# Patient Record
Sex: FEMALE | Race: WHITE | Employment: OTHER | URBAN - METROPOLITAN AREA
[De-identification: names, ages, dates, MRNs, and addresses within clinical notes are randomized per-mention and may not be internally consistent; named-entity substitution may affect disease eponyms.]

---

## 2017-06-19 ENCOUNTER — HOSPITAL ENCOUNTER (OUTPATIENT)
Dept: PHYSICAL THERAPY | Age: 62
Discharge: HOME | End: 2017-06-19
Payer: COMMERCIAL

## 2017-06-19 ENCOUNTER — HOSPITAL ENCOUNTER (OUTPATIENT)
Dept: SURGERY | Age: 62
Discharge: HOME | End: 2017-06-19
Payer: COMMERCIAL

## 2017-06-19 LAB
ANION GAP BLD CALC-SCNC: 10 MMOL/L (ref 7–16)
APPEARANCE UR: CLEAR
APTT PPP: 22.5 SEC (ref 23.5–31.7)
ATRIAL RATE: 66 BPM
BACTERIA SPEC CULT: NORMAL
BACTERIA URNS QL MICRO: 0 /HPF
BASOPHILS # BLD AUTO: 0 K/UL (ref 0–0.2)
BASOPHILS # BLD: 0 % (ref 0–2)
BILIRUB UR QL: NEGATIVE
BUN SERPL-MCNC: 15 MG/DL (ref 8–23)
CALCIUM SERPL-MCNC: 9.1 MG/DL (ref 8.3–10.4)
CALCULATED P AXIS, ECG09: 73 DEGREES
CALCULATED R AXIS, ECG10: 80 DEGREES
CALCULATED T AXIS, ECG11: 66 DEGREES
CASTS URNS QL MICRO: ABNORMAL /LPF
CHLORIDE SERPL-SCNC: 102 MMOL/L (ref 98–107)
CO2 SERPL-SCNC: 27 MMOL/L (ref 21–32)
COLOR UR: ABNORMAL
CREAT SERPL-MCNC: 0.84 MG/DL (ref 0.6–1)
DIAGNOSIS, 93000: NORMAL
DIFFERENTIAL METHOD BLD: NORMAL
EOSINOPHIL # BLD: 0.2 K/UL (ref 0–0.8)
EOSINOPHIL NFR BLD: 4 % (ref 0.5–7.8)
EPI CELLS #/AREA URNS HPF: ABNORMAL /HPF
ERYTHROCYTE [DISTWIDTH] IN BLOOD BY AUTOMATED COUNT: 13 % (ref 11.9–14.6)
GLUCOSE SERPL-MCNC: 81 MG/DL (ref 65–100)
GLUCOSE UR STRIP.AUTO-MCNC: NEGATIVE MG/DL
HCT VFR BLD AUTO: 39.5 % (ref 35.8–46.3)
HGB BLD-MCNC: 13.2 G/DL (ref 11.7–15.4)
HGB UR QL STRIP: NEGATIVE
IMM GRANULOCYTES # BLD: 0 K/UL (ref 0–0.5)
IMM GRANULOCYTES NFR BLD AUTO: 0.2 % (ref 0–5)
INR PPP: 1 (ref 0.9–1.2)
KETONES UR QL STRIP.AUTO: NEGATIVE MG/DL
LEUKOCYTE ESTERASE UR QL STRIP.AUTO: ABNORMAL
LYMPHOCYTES # BLD AUTO: 23 % (ref 13–44)
LYMPHOCYTES # BLD: 1.2 K/UL (ref 0.5–4.6)
MCH RBC QN AUTO: 29 PG (ref 26.1–32.9)
MCHC RBC AUTO-ENTMCNC: 33.4 G/DL (ref 31.4–35)
MCV RBC AUTO: 86.8 FL (ref 79.6–97.8)
MONOCYTES # BLD: 0.4 K/UL (ref 0.1–1.3)
MONOCYTES NFR BLD AUTO: 8 % (ref 4–12)
NEUTS SEG # BLD: 3.5 K/UL (ref 1.7–8.2)
NEUTS SEG NFR BLD AUTO: 65 % (ref 43–78)
NITRITE UR QL STRIP.AUTO: NEGATIVE
P-R INTERVAL, ECG05: 166 MS
PH UR STRIP: 5 [PH] (ref 5–9)
PLATELET # BLD AUTO: 214 K/UL (ref 150–450)
PMV BLD AUTO: 11.1 FL (ref 10.8–14.1)
POTASSIUM SERPL-SCNC: 3.4 MMOL/L (ref 3.5–5.1)
PROT UR STRIP-MCNC: NEGATIVE MG/DL
PROTHROMBIN TIME: 10.3 SEC (ref 8.6–12.2)
Q-T INTERVAL, ECG07: 418 MS
QRS DURATION, ECG06: 88 MS
QTC CALCULATION (BEZET), ECG08: 438 MS
RBC # BLD AUTO: 4.55 M/UL (ref 4.05–5.25)
RBC #/AREA URNS HPF: ABNORMAL /HPF
SERVICE CMNT-IMP: NORMAL
SODIUM SERPL-SCNC: 139 MMOL/L (ref 136–145)
SP GR UR REFRACTOMETRY: 1.02 (ref 1–1.02)
UROBILINOGEN UR QL STRIP.AUTO: 0.2 EU/DL (ref 0.2–1)
VENTRICULAR RATE, ECG03: 66 BPM
WBC # BLD AUTO: 5.4 K/UL (ref 4.3–11.1)
WBC URNS QL MICRO: ABNORMAL /HPF

## 2017-06-19 PROCEDURE — 97161 PT EVAL LOW COMPLEX 20 MIN: CPT

## 2017-06-19 RX ORDER — LEVOTHYROXINE SODIUM 125 UG/1
125 TABLET ORAL
COMMUNITY

## 2017-06-19 RX ORDER — ASPIRIN 325 MG
325 TABLET ORAL AS NEEDED
COMMUNITY
End: 2017-06-29

## 2017-06-19 NOTE — ADVANCED PRACTICE NURSE
Total Joint Surgery Preoperative Chart Review      Patient ID:  Debi Record  [id number]  29 y.o.  1955  Surgeon: Dr. Carlo Murphy  Date of Surgery: 6/27/2017  Procedure: Total Right Knee Arthroplasty  Primary Care Physician: Gabriela Ba [phone]  Specialty Physician(s):      Subjective:   Debi Record is a 57 y.o. WHITE OR  female who presents for preoperative evaluation for Total Right Knee arthroplasty. This is a preoperative chart review note based on data collected by the nurse at the surgical Pre-Assessment visit. Past Medical History:   Diagnosis Date    Migraine     Osteopenia       Past Surgical History:   Procedure Laterality Date    HX HYSTERECTOMY      HX ORTHOPAEDIC  1996    right knee     History reviewed. No pertinent family history. Social History   Substance Use Topics    Smoking status: Former Smoker    Smokeless tobacco: Not on file      Comment: as teenager    Alcohol use Yes      Comment: occ       Prior to Admission medications    Medication Sig Start Date End Date Taking? Authorizing Provider   levothyroxine (SYNTHROID) 125 mcg tablet Take 125 mcg by mouth Daily (before breakfast). Indications: hypothyroidism   Yes Historical Provider   aspirin (ASPIRIN) 325 mg tablet Take 325 mg by mouth as needed. Stop 5 days prior to surgery per anesthesia guidelines.    Indications: HEADACHE DISORDER   Yes Historical Provider     No Known Allergies       Objective:     Physical Exam:   Patient Vitals for the past 24 hrs:   Temp Pulse Resp BP   06/19/17 1343 96.6 °F (35.9 °C) 67 16 106/79       ECG:    EKG Results     Procedure 720 Value Units Date/Time    EKG, 12 LEAD, INITIAL [[id number]] Collected:  06/19/17 1336    Order Status:  Completed Updated:  06/19/17 1406     Ventricular Rate 66 BPM      Atrial Rate 66 BPM      P-R Interval 166 ms      QRS Duration 88 ms      Q-T Interval 418 ms      QTC Calculation (Bezet) 438 ms      Calculated P Axis 73 degrees Calculated R Axis 80 degrees      Calculated T Axis 66 degrees      Diagnosis --     Normal sinus rhythm  Normal ECG  No previous ECGs available            Data Review:   Labs:   Recent Results (from the past 24 hour(s))   CBC WITH AUTOMATED DIFF    Collection Time: 06/19/17 12:51 PM   Result Value Ref Range    WBC 5.4 4.3 - 11.1 K/uL    RBC 4.55 4.05 - 5.25 M/uL    HGB 13.2 11.7 - 15.4 g/dL    HCT 39.5 35.8 - 46.3 %    MCV 86.8 79.6 - 97.8 FL    MCH 29.0 26.1 - 32.9 PG    MCHC 33.4 31.4 - 35.0 g/dL    RDW 13.0 11.9 - 14.6 %    PLATELET 029 787 - 350 K/uL    MPV 11.1 10.8 - 14.1 FL    DF AUTOMATED      NEUTROPHILS 65 43 - 78 %    LYMPHOCYTES 23 13 - 44 %    MONOCYTES 8 4.0 - 12.0 %    EOSINOPHILS 4 0.5 - 7.8 %    BASOPHILS 0 0.0 - 2.0 %    IMMATURE GRANULOCYTES 0.2 0.0 - 5.0 %    ABS. NEUTROPHILS 3.5 1.7 - 8.2 K/UL    ABS. LYMPHOCYTES 1.2 0.5 - 4.6 K/UL    ABS. MONOCYTES 0.4 0.1 - 1.3 K/UL    ABS. EOSINOPHILS 0.2 0.0 - 0.8 K/UL    ABS. BASOPHILS 0.0 0.0 - 0.2 K/UL    ABS. IMM.  GRANS. 0.0 0.0 - 0.5 K/UL   METABOLIC PANEL, BASIC    Collection Time: 06/19/17 12:51 PM   Result Value Ref Range    Sodium 139 136 - 145 mmol/L    Potassium 3.4 (L) 3.5 - 5.1 mmol/L    Chloride 102 98 - 107 mmol/L    CO2 27 21 - 32 mmol/L    Anion gap 10 7 - 16 mmol/L    Glucose 81 65 - 100 mg/dL    BUN 15 8 - 23 MG/DL    Creatinine 0.84 0.6 - 1.0 MG/DL    GFR est AA >60 >60 ml/min/1.73m2    GFR est non-AA >60 >60 ml/min/1.73m2    Calcium 9.1 8.3 - 10.4 MG/DL   URINALYSIS W/ RFLX MICROSCOPIC    Collection Time: 06/19/17 12:51 PM   Result Value Ref Range    Color JEOVANNY      Appearance CLEAR      Specific gravity 1.024 (H) 1.001 - 1.023      pH (UA) 5.0 5.0 - 9.0      Protein NEGATIVE  NEG mg/dL    Glucose NEGATIVE  mg/dL    Ketone NEGATIVE  NEG mg/dL    Bilirubin NEGATIVE  NEG      Blood NEGATIVE  NEG      Urobilinogen 0.2 0.2 - 1.0 EU/dL    Nitrites NEGATIVE  NEG      Leukocyte Esterase SMALL (A) NEG      WBC 0-3 0 /hpf    RBC 0-3 0 /hpf Epithelial cells 0-3 0 /hpf    Bacteria 0 0 /hpf    Casts 0-3 0 /lpf   EKG, 12 LEAD, INITIAL    Collection Time: 06/19/17  1:36 PM   Result Value Ref Range    Ventricular Rate 66 BPM    Atrial Rate 66 BPM    P-R Interval 166 ms    QRS Duration 88 ms    Q-T Interval 418 ms    QTC Calculation (Bezet) 438 ms    Calculated P Axis 73 degrees    Calculated R Axis 80 degrees    Calculated T Axis 66 degrees    Diagnosis       Normal sinus rhythm  Normal ECG  No previous ECGs available         Total Joint Surgery Pre-Assessment Recommendations:        Low risk assessment based on historical assessment. No further recommendations prior to scheduled surgery.            Signed By: Theresa Spencer, NP-C    June 19, 2017

## 2017-06-19 NOTE — PERIOP NOTES
Recent Results (from the past 12 hour(s))   CBC WITH AUTOMATED DIFF    Collection Time: 06/19/17 12:51 PM   Result Value Ref Range    WBC 5.4 4.3 - 11.1 K/uL    RBC 4.55 4.05 - 5.25 M/uL    HGB 13.2 11.7 - 15.4 g/dL    HCT 39.5 35.8 - 46.3 %    MCV 86.8 79.6 - 97.8 FL    MCH 29.0 26.1 - 32.9 PG    MCHC 33.4 31.4 - 35.0 g/dL    RDW 13.0 11.9 - 14.6 %    PLATELET 674 543 - 264 K/uL    MPV 11.1 10.8 - 14.1 FL    DF AUTOMATED      NEUTROPHILS 65 43 - 78 %    LYMPHOCYTES 23 13 - 44 %    MONOCYTES 8 4.0 - 12.0 %    EOSINOPHILS 4 0.5 - 7.8 %    BASOPHILS 0 0.0 - 2.0 %    IMMATURE GRANULOCYTES 0.2 0.0 - 5.0 %    ABS. NEUTROPHILS 3.5 1.7 - 8.2 K/UL    ABS. LYMPHOCYTES 1.2 0.5 - 4.6 K/UL    ABS. MONOCYTES 0.4 0.1 - 1.3 K/UL    ABS. EOSINOPHILS 0.2 0.0 - 0.8 K/UL    ABS. BASOPHILS 0.0 0.0 - 0.2 K/UL    ABS. IMM.  GRANS. 0.0 0.0 - 0.5 K/UL   PROTHROMBIN TIME + INR    Collection Time: 06/19/17 12:51 PM   Result Value Ref Range    Prothrombin time 10.3 8.6 - 12.2 sec    INR 1.0 0.9 - 1.2     PTT    Collection Time: 06/19/17 12:51 PM   Result Value Ref Range    aPTT 22.5 (L) 23.5 - 55.4 SEC   METABOLIC PANEL, BASIC    Collection Time: 06/19/17 12:51 PM   Result Value Ref Range    Sodium 139 136 - 145 mmol/L    Potassium 3.4 (L) 3.5 - 5.1 mmol/L    Chloride 102 98 - 107 mmol/L    CO2 27 21 - 32 mmol/L    Anion gap 10 7 - 16 mmol/L    Glucose 81 65 - 100 mg/dL    BUN 15 8 - 23 MG/DL    Creatinine 0.84 0.6 - 1.0 MG/DL    GFR est AA >60 >60 ml/min/1.73m2    GFR est non-AA >60 >60 ml/min/1.73m2    Calcium 9.1 8.3 - 10.4 MG/DL   URINALYSIS W/ RFLX MICROSCOPIC    Collection Time: 06/19/17 12:51 PM   Result Value Ref Range    Color JEOVANNY      Appearance CLEAR      Specific gravity 1.024 (H) 1.001 - 1.023      pH (UA) 5.0 5.0 - 9.0      Protein NEGATIVE  NEG mg/dL    Glucose NEGATIVE  mg/dL    Ketone NEGATIVE  NEG mg/dL    Bilirubin NEGATIVE  NEG      Blood NEGATIVE  NEG      Urobilinogen 0.2 0.2 - 1.0 EU/dL    Nitrites NEGATIVE  NEG Leukocyte Esterase SMALL (A) NEG      WBC 0-3 0 /hpf    RBC 0-3 0 /hpf    Epithelial cells 0-3 0 /hpf    Bacteria 0 0 /hpf    Casts 0-3 0 /lpf   EKG, 12 LEAD, INITIAL    Collection Time: 06/19/17  1:36 PM   Result Value Ref Range    Ventricular Rate 66 BPM    Atrial Rate 66 BPM    P-R Interval 166 ms    QRS Duration 88 ms    Q-T Interval 418 ms    QTC Calculation (Bezet) 438 ms    Calculated P Axis 73 degrees    Calculated R Axis 80 degrees    Calculated T Axis 66 degrees    Diagnosis       Normal sinus rhythm  Normal ECG  No previous ECGs available

## 2017-06-19 NOTE — PROGRESS NOTES
Jessica Daugherty  : (2 y.o.) [address] at [facility]  [address], [place]  [phone]       Physical Therapy Prehab Plan of Treatment and Evaluation Summary:2017    ICD-10: Treatment Diagnosis:   · Pain in Right Knee (M25.561)  · Stiffness of Right Knee, Not elsewhere classified (M25.661)  · Difficulty in walking, Not elsewhere classified (R26.2)  · Other abnormalities of gait and mobility (R26.89)  Precautions/Allergies:   Review of patient's allergies indicates no known allergies. MEDICAL/REFERRING DIAGNOSIS:  Unilateral primary osteoarthritis, right knee [M17.11]  REFERRING PHYSICIAN: Matt Caldwell,*  DATE OF SURGERY: 17   Assessment:   Comments:  Pain in right knee joint with decreased independence with functional mobility. Pt plans to return home following hospital stay. Pt states her daughters (one is an OT) will care for her, so her  won't have to take off work. PROBLEM LIST (Impacting functional limitations):  Ms. Junior Fuller presents with the following right lower extremity(s) problems:  1. Transfers  2. Gait  3. Strength  4. Range of Motion  5. Pain   INTERVENTIONS PLANNED:  1. Home Exercise Program  2. Educational Discussion     TREATMENT PLAN: Effective Dates: 2017 TO 2017. Frequency/Duration: Patient to continue to perform home exercise program at least twice per day up until her surgery. GOALS: (Goals have been discussed and agreed upon with patient.)  Discharge Goals: Time Frame: 1 Day  1. Patient will demonstrate independence with a home exercise program designed to increase strength, range of motion and pain control to minimize functional deficits and optimize patient for total joint replacement. Rehabilitation Potential For Stated Goals: Good  Regarding Jerrell Mota's therapy, I certify that the treatment plan above will be carried out by a therapist or under their direction.   Thank you for this referral,  Clive Portillo PT               HISTORY:   Present Symptoms:  Pain Intensity 1: 3  Pain Location 1: Knee  Pain Orientation 1: Right   History of Present Injury/Illness (Reason for Referral):  Medical/Referring Diagnosis: Unilateral primary osteoarthritis, right knee [M17.11]   Past Medical History/Comorbidities:   Ms. Leslie Erwin  has a past medical history of Osteopenia. She also has no past medical history of Adverse effect of anesthesia; Difficult intubation; Malignant hyperthermia due to anesthesia; Nausea & vomiting; or Pseudocholinesterase deficiency. Ms. Leslie Erwin  has a past surgical history that includes orthopaedic (1996) and hysterectomy.   Social History/Living Environment:   Home Environment: Private residence  # Steps to Enter: 2  One/Two Story Residence: One story  Living Alone: No  Support Systems: Spouse/Significant Other/Partner, Child(katharine)  Patient Expects to be Discharged to[de-identified] Private residence  Current DME Used/Available at Home: Walker, Commode, bedside, [address] chair  Tub or Shower Type: Shower  Work/Activity:  Employment as a massage therapist requires sitting  Dominant Side:  RIGHT  Current Medications:  See Pre-assessment nursing note   Number of Personal Factors/Comorbidities that affect the Plan of Care: 0: LOW COMPLEXITY   EXAMINATION:   ADLs (Current Functional Status):   Ambulation:  [x] Independent  [] Walk Indoors Only  [] Walk Outdoors  [] Use Assistive Device  [] Use Wheelchair Only Dressing:  [x] [address] from Someone for:  [] Sock/Shoes  [] Pants  [] Everything   Bathing/Showering:   [x] Independent  [] Requires Assistance from Someone  [] [address]:  [x] Routine house and yard work  [] Light Housework Only  [] None   Observation/Orthostatic Postural Assessment:   Within defined limits   ROM/Flexibility:   Gross Assessment: Yes  AROM: Generally decreased, functional                LLE Assessment  LLE Assessment (WDL): Within defined limits      RLE AROM  R Knee Flexion: 100  R Knee Extension: 12   Strength:   Gross Assessment: Yes  Strength: Generally decreased, functional                  Functional Mobility:    Gross Assessment: Yes  Coordination: Generally decreased, functional    Gait Description (WDL): Within defined limits  Stand to Sit: Independent  Sit to Stand: Independent  Distance (ft): 1000 Feet (ft)  Ambulation - Level of Assistance: Independent  Gait Abnormalities: Antalgic          Balance:    Sitting: Intact  Standing: Intact   Body Structures Involved:  1. Bones  2. Joints  3. Muscles Body Functions Affected:  1. Neuromusculoskeletal  2. Movement Related Activities and Participation Affected:  1. Mobility   Number of elements that affect the Plan of Care: 4+: HIGH COMPLEXITY   CLINICAL PRESENTATION:   Presentation: Stable and uncomplicated: LOW COMPLEXITY   CLINICAL DECISION MAKING:   Outcome Measure: Tool Used: Lower Extremity Functional Scale (LEFS)  Score:  Initial: 33/80 Most Recent: X/80 (Date: -- )   Interpretation of Score: 20 questions each scored on a 5 point scale with 0 representing \"extreme difficulty or unable to perform\" and 4 representing \"no difficulty\". The lower the score, the greater the functional disability. 80/80 represents no disability. Minimal detectable change is 9 points. Score 80 79-65 64-49 48-33 32-17 16-1 0   Modifier CH CI CJ CK CL CM CN     ? Mobility - Walking and Moving Around:     - CURRENT STATUS: CK - 40%-59% impaired, limited or restricted    - GOAL STATUS: CK - 40%-59% impaired, limited or restricted    - D/C STATUS:  CK - 40%-59% impaired, limited or restricted  Medical Necessity:   · Ms. Margarita Landa is expected to optimize her lower extremity strength and ROM in preparation for joint replacement surgery. Reason for Services/Other Comments:  · Achieve baseline assesment of musculoskeletal system, functional mobility and home environment. , educate in PT HEP in preparation for surgery, educate in hospital plan of care. Use of outcome tool(s) and clinical judgement create a POC that gives a: Clear prediction of patient's progress: LOW COMPLEXITY   TREATMENT:   Treatment/Session Assessment:  Patient was instructed in PT- HEP to increase strength and ROM in LEs. Answered all questions. · Post session pain:  3  · Compliance with Program/Exercises: compliant most of the time.   Total Treatment Duration:  PT Patient Time In/Time Out  Time In: 1230  Time Out: KULWINDER Jerome

## 2017-06-19 NOTE — PERIOP NOTES
Recent Results (from the past 12 hour(s))   CBC WITH AUTOMATED DIFF    Collection Time: 06/19/17 12:51 PM   Result Value Ref Range    WBC 5.4 4.3 - 11.1 K/uL    RBC 4.55 4.05 - 5.25 M/uL    HGB 13.2 11.7 - 15.4 g/dL    HCT 39.5 35.8 - 46.3 %    MCV 86.8 79.6 - 97.8 FL    MCH 29.0 26.1 - 32.9 PG    MCHC 33.4 31.4 - 35.0 g/dL    RDW 13.0 11.9 - 14.6 %    PLATELET 412 038 - 893 K/uL    MPV 11.1 10.8 - 14.1 FL    DF AUTOMATED      NEUTROPHILS 65 43 - 78 %    LYMPHOCYTES 23 13 - 44 %    MONOCYTES 8 4.0 - 12.0 %    EOSINOPHILS 4 0.5 - 7.8 %    BASOPHILS 0 0.0 - 2.0 %    IMMATURE GRANULOCYTES 0.2 0.0 - 5.0 %    ABS. NEUTROPHILS 3.5 1.7 - 8.2 K/UL    ABS. LYMPHOCYTES 1.2 0.5 - 4.6 K/UL    ABS. MONOCYTES 0.4 0.1 - 1.3 K/UL    ABS. EOSINOPHILS 0.2 0.0 - 0.8 K/UL    ABS. BASOPHILS 0.0 0.0 - 0.2 K/UL    ABS. IMM.  GRANS. 0.0 0.0 - 0.5 K/UL   PROTHROMBIN TIME + INR    Collection Time: 06/19/17 12:51 PM   Result Value Ref Range    Prothrombin time 10.3 8.6 - 12.2 sec    INR 1.0 0.9 - 1.2     PTT    Collection Time: 06/19/17 12:51 PM   Result Value Ref Range    aPTT 22.5 (L) 23.5 - 69.6 SEC   METABOLIC PANEL, BASIC    Collection Time: 06/19/17 12:51 PM   Result Value Ref Range    Sodium 139 136 - 145 mmol/L    Potassium 3.4 (L) 3.5 - 5.1 mmol/L    Chloride 102 98 - 107 mmol/L    CO2 27 21 - 32 mmol/L    Anion gap 10 7 - 16 mmol/L    Glucose 81 65 - 100 mg/dL    BUN 15 8 - 23 MG/DL    Creatinine 0.84 0.6 - 1.0 MG/DL    GFR est AA >60 >60 ml/min/1.73m2    GFR est non-AA >60 >60 ml/min/1.73m2    Calcium 9.1 8.3 - 10.4 MG/DL   URINALYSIS W/ RFLX MICROSCOPIC    Collection Time: 06/19/17 12:51 PM   Result Value Ref Range    Color JEOVANNY      Appearance CLEAR      Specific gravity 1.024 (H) 1.001 - 1.023      pH (UA) 5.0 5.0 - 9.0      Protein NEGATIVE  NEG mg/dL    Glucose NEGATIVE  mg/dL    Ketone NEGATIVE  NEG mg/dL    Bilirubin NEGATIVE  NEG      Blood NEGATIVE  NEG      Urobilinogen 0.2 0.2 - 1.0 EU/dL    Nitrites NEGATIVE  NEG Leukocyte Esterase SMALL (A) NEG      WBC 0-3 0 /hpf    RBC 0-3 0 /hpf    Epithelial cells 0-3 0 /hpf    Bacteria 0 0 /hpf    Casts 0-3 0 /lpf   EKG, 12 LEAD, INITIAL    Collection Time: 06/19/17  1:36 PM   Result Value Ref Range    Ventricular Rate 66 BPM    Atrial Rate 66 BPM    P-R Interval 166 ms    QRS Duration 88 ms    Q-T Interval 418 ms    QTC Calculation (Bezet) 438 ms    Calculated P Axis 73 degrees    Calculated R Axis 80 degrees    Calculated T Axis 66 degrees    Diagnosis       Normal sinus rhythm  Normal ECG  No previous ECGs available

## 2017-06-21 VITALS
OXYGEN SATURATION: 99 % | TEMPERATURE: 96.6 F | WEIGHT: 134.2 LBS | SYSTOLIC BLOOD PRESSURE: 106 MMHG | DIASTOLIC BLOOD PRESSURE: 79 MMHG | RESPIRATION RATE: 16 BRPM | HEIGHT: 66 IN | BODY MASS INDEX: 21.57 KG/M2 | HEART RATE: 67 BPM

## 2017-06-21 NOTE — PROGRESS NOTES
17 1200   Oxygen Therapy   O2 Sat (%) 99 %   Pulse via Oximetry 78 beats per minute   O2 Device Room air   Pre-Treatment   Breath Sounds Bilateral Clear   Pre FEV1 (liters) 2.4 liters   % Predicted 94     Initial respiratory Assessment completed with pt. Pt was interviewed and evaluated in Joint camp prior to surgery. Patient ID:  Anuj Treviño  [id number]  26 y.o.  1955  Surgeon: Dr. Tabitha Kendrick  Date of Surgery: 2017  Procedure: Total Right Knee Arthroplasty  Primary Care Physician: Rayna Barr -[phone]  Specialists:                                  Pt instructed in the use of Incentive Spirometry. Pt instructed to bring Incentive Spirometer back on date of surgery & to start using Is upon return to pt room. Pt taught proper cough technique      History of smokin PACK EVERY 3 DAYS FOR 15 YEARS   Quit date:20 YEARS AGO    Secondhand smoke:PARENTS      Past procedures with Oxygen desaturation:NONE    Past Medical History:   Diagnosis Date    Migraine     Osteopenia                                                                                                                                                         Respiratory history:                                    DENIES SOB                                  Respiratory meds:                                                         FAMILY PRESENT:                 NO                                        PAST SLEEP STUDY:             NO  HX OF RHIANNON:                            NO                                     RHIANNON assessment:                                               SLEEPS ON SIDE                                                  PHYSICAL EXAM   Body mass index is 21.99 kg/(m^2).    Visit Vitals    /79 (BP 1 Location: Left arm, BP Patient Position: At rest;Sitting)    Pulse 67    Temp 96.6 °F (35.9 °C)    Resp 16    Ht 5' 5.5\" (1.664 m)    Wt 60.9 kg (134 lb 3.2 oz)    SpO2 (P) 99%    BMI 21.99 kg/m2 Neck circumference: 32     cm    Loud snoring:NOT MUCH ACCORDING TO     Witnessed apnea or wakening gasping or choking:,DENIES,    Awakens with headaches:DENIES    Morning or daytime tiredness/ sleepiness: DENIES    Dry mouth or sore throat in morning: DENIES    Freidman stage:4    SACS score:1    STOP/BAN                              CPAP:NA               NONE  Referrals:    Pt. Phone Number:

## 2017-06-23 NOTE — H&P
H&P    Patient ID:  Joseph Norris  [id number]  74 y.o.  1955  Surgeon:  Carlos Monroy MD  Date of Surgery: * No surgery date entered *  Procedure: Right Knee Total Arthroplasty  Primary Care Physician: Nina Liu MD        Subjective:  Joseph Norris is a 57 y.o. WHITE OR  female who presents with Right Knee pain. She has history of Right Knee pain for several years ago. Symptoms worse with walking, squatting, climbing stairs, weight bearing, initiation of activity and bathing and relieved with rest, NSAIDs: How long months, activity modification and steroid injections. Conservative treatment consisting of  activity modification, NSAIDs, analgesics and therapeutic injections into the knee has not helped. The patient  lives with their family. The patients goal after surgery is improved pain and function. Past Medical History:   Diagnosis Date    Migraine     Osteopenia       Past Surgical History:   Procedure Laterality Date    HX HYSTERECTOMY      HX ORTHOPAEDIC  1996    right knee     No family history on file. Social History   Substance Use Topics    Smoking status: Former Smoker    Smokeless tobacco: Not on file      Comment: as teenager    Alcohol use Yes      Comment: occ       Prior to Admission medications    Medication Sig Start Date End Date Taking? Authorizing Provider   levothyroxine (SYNTHROID) 125 mcg tablet Take 125 mcg by mouth Daily (before breakfast). Indications: hypothyroidism    Historical Provider   aspirin (ASPIRIN) 325 mg tablet Take 325 mg by mouth as needed. Stop 5 days prior to surgery per anesthesia guidelines. Indications: HEADACHE DISORDER    Historical Provider     No Known Allergies     REVIEW OF SYSTEMS:  CONSTITUTIONAL: Denies fever, decreased appetite, weight loss/gain, night sweats or fatigue. HEENT: Denies vision or hearing changes. denies glasses. denies hearing aids.  CARDIAC: Denies CP, palpitations, rheumatic fever, murmur, peripheral edema, carotid artery disease or syncopal episodes. RESPIRATORY: Denies dyspnea on exertion, asthma, COPD or orthopnea. GI: Denies GERD, history of GI bleed or melena, PUD, hepatitis or cirrhosis. : Denies dysuria, hematuria. denies BPH symptoms. HEMATOLOGIC: Denies anemia or blood disorders. ENDOCRINE: Denies thyroid disease. MUSCULOSKELETAL: See HPI. NEUROLOGIC: Denies seizure, peripheral neuropathy or memory loss. PSYCH: Denies depression, anxiety or insomnia. SKIN: Denies rash or open sores. Objective:    PHYSICAL EXAM  GENERAL: No data found. EYES: PERRL. EOM intact. MOUTH:Teeth and Gums normal. NECK: Full ROM. Trachea midline. No thyromegaly or JVD. CARDIOVASCULAR: Regular rate and rhythm. No murmur or gallops. No carotid bruits. Peripheral pulses: radial  +, PT +, DP + bilaterally. LUNGS: CTA bilaterally. No wheezes, rhonchi or rales. GI: positive BS. Abdomen nontender. NEUROLOGIC: Alert and oriented x 3. Bilateral equal strong had grasp and bilateral equal strong plantar flexion and dorsiflexion. GAIT: normal  MUSCULOSKELETAL: ROM: diminished range with pain. Tenderness: Medial joint line and Patella. Crepitus: present. SKIN: No rash, bruising, swelling, redness or warmth. Labs:  No results found for this or any previous visit (from the past 24 hour(s)). Xray Right Knee: Subchondral sclerosis  Joint space narrowing  Bone on bone articulation    Assessment:  Advanced Right Knee Osteoarthritis. Total Right Knee Arthroplasty Indicated. There is no problem list on file for this patient. Plan:  I have advised the patient of the risks and consequences, including possible complications of performing total joint replacement, as well as not doing this operation. The patient had the opportunity to ask questions and have them answered to their satisfaction.      Signed:  SONDRA St 6/23/2017

## 2017-06-26 ENCOUNTER — ANESTHESIA EVENT (OUTPATIENT)
Dept: SURGERY | Age: 62
DRG: 470 | End: 2017-06-26
Payer: COMMERCIAL

## 2017-06-27 ENCOUNTER — HOSPITAL ENCOUNTER (INPATIENT)
Age: 62
LOS: 2 days | Discharge: HOME HEALTH | DRG: 470 | End: 2017-06-29
Attending: ORTHOPAEDIC SURGERY | Admitting: ORTHOPAEDIC SURGERY
Payer: COMMERCIAL

## 2017-06-27 ENCOUNTER — ANESTHESIA (OUTPATIENT)
Dept: SURGERY | Age: 62
DRG: 470 | End: 2017-06-27
Payer: COMMERCIAL

## 2017-06-27 ENCOUNTER — HOME HEALTH ADMISSION (OUTPATIENT)
Dept: HOME HEALTH SERVICES | Facility: HOME HEALTH | Age: 62
End: 2017-06-27
Payer: COMMERCIAL

## 2017-06-27 DIAGNOSIS — Z96.651 STATUS POST TOTAL RIGHT KNEE REPLACEMENT: Primary | ICD-10-CM

## 2017-06-27 PROBLEM — M17.11 ARTHRITIS OF KNEE, RIGHT: Status: ACTIVE | Noted: 2017-06-27

## 2017-06-27 LAB — GLUCOSE BLD STRIP.AUTO-MCNC: 97 MG/DL (ref 65–100)

## 2017-06-27 PROCEDURE — 77030002966 HC SUT PDS J&J -A: Performed by: ORTHOPAEDIC SURGERY

## 2017-06-27 PROCEDURE — 0SRC0JA REPLACEMENT OF RIGHT KNEE JOINT WITH SYNTHETIC SUBSTITUTE, UNCEMENTED, OPEN APPROACH: ICD-10-PCS | Performed by: ORTHOPAEDIC SURGERY

## 2017-06-27 PROCEDURE — 77030035643 HC BLD SAW OSC PRECIS STRY -C: Performed by: ORTHOPAEDIC SURGERY

## 2017-06-27 PROCEDURE — 86580 TB INTRADERMAL TEST: CPT | Performed by: ORTHOPAEDIC SURGERY

## 2017-06-27 PROCEDURE — 76210000016 HC OR PH I REC 1 TO 1.5 HR: Performed by: ORTHOPAEDIC SURGERY

## 2017-06-27 PROCEDURE — 74011250636 HC RX REV CODE- 250/636: Performed by: ORTHOPAEDIC SURGERY

## 2017-06-27 PROCEDURE — 77030018836 HC SOL IRR NACL ICUM -A: Performed by: ORTHOPAEDIC SURGERY

## 2017-06-27 PROCEDURE — 86900 BLOOD TYPING SEROLOGIC ABO: CPT | Performed by: PHYSICIAN ASSISTANT

## 2017-06-27 PROCEDURE — 76010010054 HC POST OP PAIN BLOCK: Performed by: ORTHOPAEDIC SURGERY

## 2017-06-27 PROCEDURE — 65270000029 HC RM PRIVATE

## 2017-06-27 PROCEDURE — 77030034849: Performed by: ORTHOPAEDIC SURGERY

## 2017-06-27 PROCEDURE — 94760 N-INVAS EAR/PLS OXIMETRY 1: CPT

## 2017-06-27 PROCEDURE — 77030008467 HC STPLR SKN COVD -B: Performed by: ORTHOPAEDIC SURGERY

## 2017-06-27 PROCEDURE — 76942 ECHO GUIDE FOR BIOPSY: CPT | Performed by: ORTHOPAEDIC SURGERY

## 2017-06-27 PROCEDURE — 77030007880 HC KT SPN EPDRL BBMI -B: Performed by: ANESTHESIOLOGY

## 2017-06-27 PROCEDURE — 86923 COMPATIBILITY TEST ELECTRIC: CPT | Performed by: PHYSICIAN ASSISTANT

## 2017-06-27 PROCEDURE — 74011000250 HC RX REV CODE- 250

## 2017-06-27 PROCEDURE — 77030019557 HC ELECTRD VES SEAL MEDT -F: Performed by: ORTHOPAEDIC SURGERY

## 2017-06-27 PROCEDURE — 82962 GLUCOSE BLOOD TEST: CPT

## 2017-06-27 PROCEDURE — 77030012890

## 2017-06-27 PROCEDURE — 74011250637 HC RX REV CODE- 250/637: Performed by: ORTHOPAEDIC SURGERY

## 2017-06-27 PROCEDURE — 77030011640 HC PAD GRND REM COVD -A: Performed by: ORTHOPAEDIC SURGERY

## 2017-06-27 PROCEDURE — 77030012935 HC DRSG AQUACEL BMS -B: Performed by: ORTHOPAEDIC SURGERY

## 2017-06-27 PROCEDURE — 97161 PT EVAL LOW COMPLEX 20 MIN: CPT

## 2017-06-27 PROCEDURE — 74011000302 HC RX REV CODE- 302: Performed by: ORTHOPAEDIC SURGERY

## 2017-06-27 PROCEDURE — 77030032490 HC SLV COMPR SCD KNE COVD -B

## 2017-06-27 PROCEDURE — 74011250637 HC RX REV CODE- 250/637: Performed by: ANESTHESIOLOGY

## 2017-06-27 PROCEDURE — 77030003665 HC NDL SPN BBMI -A: Performed by: ANESTHESIOLOGY

## 2017-06-27 PROCEDURE — 74011250636 HC RX REV CODE- 250/636: Performed by: ANESTHESIOLOGY

## 2017-06-27 PROCEDURE — C1713 ANCHOR/SCREW BN/BN,TIS/BN: HCPCS | Performed by: ORTHOPAEDIC SURGERY

## 2017-06-27 PROCEDURE — 74011000258 HC RX REV CODE- 258: Performed by: ORTHOPAEDIC SURGERY

## 2017-06-27 PROCEDURE — C1776 JOINT DEVICE (IMPLANTABLE): HCPCS | Performed by: ORTHOPAEDIC SURGERY

## 2017-06-27 PROCEDURE — 74011000250 HC RX REV CODE- 250: Performed by: ORTHOPAEDIC SURGERY

## 2017-06-27 PROCEDURE — 77030031139 HC SUT VCRL2 J&J -A: Performed by: ORTHOPAEDIC SURGERY

## 2017-06-27 PROCEDURE — 74011000250 HC RX REV CODE- 250: Performed by: ANESTHESIOLOGY

## 2017-06-27 PROCEDURE — 74011250636 HC RX REV CODE- 250/636

## 2017-06-27 PROCEDURE — 76010000171 HC OR TIME 2 TO 2.5 HR INTENSV-TIER 1: Performed by: ORTHOPAEDIC SURGERY

## 2017-06-27 PROCEDURE — 76060000035 HC ANESTHESIA 2 TO 2.5 HR: Performed by: ORTHOPAEDIC SURGERY

## 2017-06-27 PROCEDURE — 97165 OT EVAL LOW COMPLEX 30 MIN: CPT

## 2017-06-27 PROCEDURE — 77030002933 HC SUT MCRYL J&J -A: Performed by: ORTHOPAEDIC SURGERY

## 2017-06-27 PROCEDURE — 77030020782 HC GWN BAIR PAWS FLX 3M -B: Performed by: ANESTHESIOLOGY

## 2017-06-27 PROCEDURE — 77030036688 HC BLNKT CLD THER S2SG -B

## 2017-06-27 PROCEDURE — 77030011208: Performed by: ORTHOPAEDIC SURGERY

## 2017-06-27 PROCEDURE — 77030013727 HC IRR FAN PULSVC ZIMM -B: Performed by: ORTHOPAEDIC SURGERY

## 2017-06-27 DEVICE — IMPLANTABLE DEVICE: Type: IMPLANTABLE DEVICE | Site: KNEE | Status: FUNCTIONAL

## 2017-06-27 DEVICE — INSERT TIB SZ 4 THK9MM UNIV KNEE POLYETH CNDYL STBL PRI: Type: IMPLANTABLE DEVICE | Site: KNEE | Status: FUNCTIONAL

## 2017-06-27 DEVICE — (D)CEMENT BNE HV R 40GM -- DUPE USE ITEM 353850: Type: IMPLANTABLE DEVICE | Site: KNEE | Status: FUNCTIONAL

## 2017-06-27 DEVICE — BASEPLATE TIB SZ 4 AP46MM ML70MM KNEE TRITANIUM 4 CRUCFRM: Type: IMPLANTABLE DEVICE | Site: KNEE | Status: FUNCTIONAL

## 2017-06-27 RX ORDER — MIDAZOLAM HYDROCHLORIDE 1 MG/ML
INJECTION, SOLUTION INTRAMUSCULAR; INTRAVENOUS AS NEEDED
Status: DISCONTINUED | OUTPATIENT
Start: 2017-06-27 | End: 2017-06-27 | Stop reason: HOSPADM

## 2017-06-27 RX ORDER — FENTANYL CITRATE 50 UG/ML
INJECTION, SOLUTION INTRAMUSCULAR; INTRAVENOUS AS NEEDED
Status: DISCONTINUED | OUTPATIENT
Start: 2017-06-27 | End: 2017-06-27 | Stop reason: HOSPADM

## 2017-06-27 RX ORDER — ROPIVACAINE HYDROCHLORIDE 2 MG/ML
INJECTION, SOLUTION EPIDURAL; INFILTRATION; PERINEURAL AS NEEDED
Status: DISCONTINUED | OUTPATIENT
Start: 2017-06-27 | End: 2017-06-27 | Stop reason: HOSPADM

## 2017-06-27 RX ORDER — ONDANSETRON 2 MG/ML
4 INJECTION INTRAMUSCULAR; INTRAVENOUS
Status: DISCONTINUED | OUTPATIENT
Start: 2017-06-27 | End: 2017-06-27

## 2017-06-27 RX ORDER — CELECOXIB 200 MG/1
200 CAPSULE ORAL ONCE
Status: COMPLETED | OUTPATIENT
Start: 2017-06-27 | End: 2017-06-27

## 2017-06-27 RX ORDER — KETOROLAC TROMETHAMINE 30 MG/ML
INJECTION, SOLUTION INTRAMUSCULAR; INTRAVENOUS AS NEEDED
Status: DISCONTINUED | OUTPATIENT
Start: 2017-06-27 | End: 2017-06-27 | Stop reason: HOSPADM

## 2017-06-27 RX ORDER — DEXTROSE MONOHYDRATE AND SODIUM CHLORIDE 5; .45 G/100ML; G/100ML
125 INJECTION, SOLUTION INTRAVENOUS CONTINUOUS
Status: DISCONTINUED | OUTPATIENT
Start: 2017-06-27 | End: 2017-06-27

## 2017-06-27 RX ORDER — MIDAZOLAM HYDROCHLORIDE 1 MG/ML
2 INJECTION, SOLUTION INTRAMUSCULAR; INTRAVENOUS ONCE
Status: COMPLETED | OUTPATIENT
Start: 2017-06-27 | End: 2017-06-27

## 2017-06-27 RX ORDER — HYDROMORPHONE HYDROCHLORIDE 2 MG/1
2 TABLET ORAL
Status: DISCONTINUED | OUTPATIENT
Start: 2017-06-27 | End: 2017-06-28

## 2017-06-27 RX ORDER — ENOXAPARIN SODIUM 100 MG/ML
40 INJECTION SUBCUTANEOUS DAILY
Status: DISCONTINUED | OUTPATIENT
Start: 2017-06-28 | End: 2017-06-27

## 2017-06-27 RX ORDER — OXYCODONE HYDROCHLORIDE 5 MG/1
5 TABLET ORAL
Status: DISCONTINUED | OUTPATIENT
Start: 2017-06-27 | End: 2017-06-27

## 2017-06-27 RX ORDER — HYDROMORPHONE HYDROCHLORIDE 2 MG/ML
0.5 INJECTION, SOLUTION INTRAMUSCULAR; INTRAVENOUS; SUBCUTANEOUS
Status: DISCONTINUED | OUTPATIENT
Start: 2017-06-27 | End: 2017-06-27 | Stop reason: HOSPADM

## 2017-06-27 RX ORDER — LIDOCAINE HYDROCHLORIDE 10 MG/ML
0.1 INJECTION INFILTRATION; PERINEURAL AS NEEDED
Status: DISCONTINUED | OUTPATIENT
Start: 2017-06-27 | End: 2017-06-30 | Stop reason: HOSPADM

## 2017-06-27 RX ORDER — BUPIVACAINE HYDROCHLORIDE 7.5 MG/ML
INJECTION, SOLUTION INTRASPINAL AS NEEDED
Status: DISCONTINUED | OUTPATIENT
Start: 2017-06-27 | End: 2017-06-27 | Stop reason: HOSPADM

## 2017-06-27 RX ORDER — SODIUM CHLORIDE, SODIUM LACTATE, POTASSIUM CHLORIDE, CALCIUM CHLORIDE 600; 310; 30; 20 MG/100ML; MG/100ML; MG/100ML; MG/100ML
100 INJECTION, SOLUTION INTRAVENOUS CONTINUOUS
Status: DISCONTINUED | OUTPATIENT
Start: 2017-06-27 | End: 2017-06-27 | Stop reason: HOSPADM

## 2017-06-27 RX ORDER — ASPIRIN 325 MG
325 TABLET, DELAYED RELEASE (ENTERIC COATED) ORAL EVERY 12 HOURS
Status: DISCONTINUED | OUTPATIENT
Start: 2017-06-27 | End: 2017-06-30 | Stop reason: HOSPADM

## 2017-06-27 RX ORDER — ONDANSETRON 2 MG/ML
INJECTION INTRAMUSCULAR; INTRAVENOUS AS NEEDED
Status: DISCONTINUED | OUTPATIENT
Start: 2017-06-27 | End: 2017-06-27 | Stop reason: HOSPADM

## 2017-06-27 RX ORDER — SODIUM CHLORIDE 0.9 % (FLUSH) 0.9 %
5-10 SYRINGE (ML) INJECTION EVERY 8 HOURS
Status: DISCONTINUED | OUTPATIENT
Start: 2017-06-27 | End: 2017-06-30 | Stop reason: HOSPADM

## 2017-06-27 RX ORDER — AMOXICILLIN 250 MG
2 CAPSULE ORAL DAILY
Status: DISCONTINUED | OUTPATIENT
Start: 2017-06-28 | End: 2017-06-30 | Stop reason: HOSPADM

## 2017-06-27 RX ORDER — ZOLPIDEM TARTRATE 5 MG/1
5 TABLET ORAL
Status: DISCONTINUED | OUTPATIENT
Start: 2017-06-27 | End: 2017-06-30 | Stop reason: HOSPADM

## 2017-06-27 RX ORDER — MIDAZOLAM HYDROCHLORIDE 1 MG/ML
2 INJECTION, SOLUTION INTRAMUSCULAR; INTRAVENOUS
Status: DISCONTINUED | OUTPATIENT
Start: 2017-06-27 | End: 2017-06-30 | Stop reason: HOSPADM

## 2017-06-27 RX ORDER — ACETAMINOPHEN 10 MG/ML
1000 INJECTION, SOLUTION INTRAVENOUS ONCE
Status: COMPLETED | OUTPATIENT
Start: 2017-06-27 | End: 2017-06-27

## 2017-06-27 RX ORDER — ACETAMINOPHEN 500 MG
1000 TABLET ORAL EVERY 6 HOURS
Status: DISCONTINUED | OUTPATIENT
Start: 2017-06-28 | End: 2017-06-30 | Stop reason: HOSPADM

## 2017-06-27 RX ORDER — OXYCODONE HYDROCHLORIDE 5 MG/1
5 TABLET ORAL
Status: DISCONTINUED | OUTPATIENT
Start: 2017-06-27 | End: 2017-06-27 | Stop reason: HOSPADM

## 2017-06-27 RX ORDER — CELECOXIB 200 MG/1
200 CAPSULE ORAL EVERY 12 HOURS
Status: DISCONTINUED | OUTPATIENT
Start: 2017-06-27 | End: 2017-06-30 | Stop reason: HOSPADM

## 2017-06-27 RX ORDER — PROMETHAZINE HYDROCHLORIDE 25 MG/ML
12.5 INJECTION, SOLUTION INTRAMUSCULAR; INTRAVENOUS
Status: DISCONTINUED | OUTPATIENT
Start: 2017-06-27 | End: 2017-06-30 | Stop reason: HOSPADM

## 2017-06-27 RX ORDER — DIPHENHYDRAMINE HCL 25 MG
25 CAPSULE ORAL
Status: DISCONTINUED | OUTPATIENT
Start: 2017-06-27 | End: 2017-06-30 | Stop reason: HOSPADM

## 2017-06-27 RX ORDER — NALOXONE HYDROCHLORIDE 0.4 MG/ML
.2-.4 INJECTION, SOLUTION INTRAMUSCULAR; INTRAVENOUS; SUBCUTANEOUS
Status: DISCONTINUED | OUTPATIENT
Start: 2017-06-27 | End: 2017-06-30 | Stop reason: HOSPADM

## 2017-06-27 RX ORDER — ACETAMINOPHEN 500 MG
1000 TABLET ORAL ONCE
Status: DISCONTINUED | OUTPATIENT
Start: 2017-06-27 | End: 2017-06-27 | Stop reason: HOSPADM

## 2017-06-27 RX ORDER — DEXAMETHASONE SODIUM PHOSPHATE 100 MG/10ML
10 INJECTION INTRAMUSCULAR; INTRAVENOUS ONCE
Status: ACTIVE | OUTPATIENT
Start: 2017-06-28 | End: 2017-06-29

## 2017-06-27 RX ORDER — SODIUM CHLORIDE, SODIUM LACTATE, POTASSIUM CHLORIDE, CALCIUM CHLORIDE 600; 310; 30; 20 MG/100ML; MG/100ML; MG/100ML; MG/100ML
100 INJECTION, SOLUTION INTRAVENOUS CONTINUOUS
Status: DISCONTINUED | OUTPATIENT
Start: 2017-06-27 | End: 2017-06-27

## 2017-06-27 RX ORDER — TRANEXAMIC ACID 100 MG/ML
INJECTION, SOLUTION INTRAVENOUS AS NEEDED
Status: DISCONTINUED | OUTPATIENT
Start: 2017-06-27 | End: 2017-06-27 | Stop reason: HOSPADM

## 2017-06-27 RX ORDER — OXYCODONE HYDROCHLORIDE 5 MG/1
10 TABLET ORAL
Status: DISCONTINUED | OUTPATIENT
Start: 2017-06-27 | End: 2017-06-30 | Stop reason: HOSPADM

## 2017-06-27 RX ORDER — PROPOFOL 10 MG/ML
INJECTION, EMULSION INTRAVENOUS
Status: DISCONTINUED | OUTPATIENT
Start: 2017-06-27 | End: 2017-06-27 | Stop reason: HOSPADM

## 2017-06-27 RX ORDER — DEXAMETHASONE SODIUM PHOSPHATE 4 MG/ML
INJECTION, SOLUTION INTRA-ARTICULAR; INTRALESIONAL; INTRAMUSCULAR; INTRAVENOUS; SOFT TISSUE AS NEEDED
Status: DISCONTINUED | OUTPATIENT
Start: 2017-06-27 | End: 2017-06-27 | Stop reason: HOSPADM

## 2017-06-27 RX ORDER — SODIUM CHLORIDE 9 MG/ML
100 INJECTION, SOLUTION INTRAVENOUS CONTINUOUS
Status: DISCONTINUED | OUTPATIENT
Start: 2017-06-27 | End: 2017-06-30 | Stop reason: HOSPADM

## 2017-06-27 RX ORDER — FENTANYL CITRATE 50 UG/ML
100 INJECTION, SOLUTION INTRAMUSCULAR; INTRAVENOUS ONCE
Status: COMPLETED | OUTPATIENT
Start: 2017-06-27 | End: 2017-06-27

## 2017-06-27 RX ORDER — NEOMYCIN AND POLYMYXIN B SULFATES 40; 200000 MG/ML; [USP'U]/ML
SOLUTION IRRIGATION AS NEEDED
Status: DISCONTINUED | OUTPATIENT
Start: 2017-06-27 | End: 2017-06-27 | Stop reason: HOSPADM

## 2017-06-27 RX ORDER — CEFAZOLIN SODIUM IN 0.9 % NACL 2 G/50 ML
2 INTRAVENOUS SOLUTION, PIGGYBACK (ML) INTRAVENOUS EVERY 8 HOURS
Status: COMPLETED | OUTPATIENT
Start: 2017-06-27 | End: 2017-06-28

## 2017-06-27 RX ORDER — SODIUM CHLORIDE 0.9 % (FLUSH) 0.9 %
5-10 SYRINGE (ML) INJECTION AS NEEDED
Status: DISCONTINUED | OUTPATIENT
Start: 2017-06-27 | End: 2017-06-30 | Stop reason: HOSPADM

## 2017-06-27 RX ORDER — CEFAZOLIN SODIUM IN 0.9 % NACL 2 G/50 ML
2 INTRAVENOUS SOLUTION, PIGGYBACK (ML) INTRAVENOUS ONCE
Status: COMPLETED | OUTPATIENT
Start: 2017-06-27 | End: 2017-06-27

## 2017-06-27 RX ADMIN — MIDAZOLAM HYDROCHLORIDE 1 MG: 1 INJECTION, SOLUTION INTRAMUSCULAR; INTRAVENOUS at 08:51

## 2017-06-27 RX ADMIN — FENTANYL CITRATE 50 MCG: 50 INJECTION, SOLUTION INTRAMUSCULAR; INTRAVENOUS at 08:15

## 2017-06-27 RX ADMIN — CEFAZOLIN 2 G: 1 INJECTION, POWDER, FOR SOLUTION INTRAMUSCULAR; INTRAVENOUS; PARENTERAL at 17:23

## 2017-06-27 RX ADMIN — ONDANSETRON 4 MG: 2 INJECTION INTRAMUSCULAR; INTRAVENOUS at 08:48

## 2017-06-27 RX ADMIN — SODIUM CHLORIDE, SODIUM LACTATE, POTASSIUM CHLORIDE, AND CALCIUM CHLORIDE 100 ML/HR: 600; 310; 30; 20 INJECTION, SOLUTION INTRAVENOUS at 06:38

## 2017-06-27 RX ADMIN — MIDAZOLAM HYDROCHLORIDE 2 MG: 1 INJECTION, SOLUTION INTRAMUSCULAR; INTRAVENOUS at 07:49

## 2017-06-27 RX ADMIN — CELECOXIB 200 MG: 200 CAPSULE ORAL at 06:37

## 2017-06-27 RX ADMIN — HYDROMORPHONE HYDROCHLORIDE 2 MG: 2 TABLET ORAL at 14:23

## 2017-06-27 RX ADMIN — SODIUM CHLORIDE 100 ML/HR: 900 INJECTION, SOLUTION INTRAVENOUS at 14:23

## 2017-06-27 RX ADMIN — MIDAZOLAM HYDROCHLORIDE 1 MG: 1 INJECTION, SOLUTION INTRAMUSCULAR; INTRAVENOUS at 08:19

## 2017-06-27 RX ADMIN — CEFAZOLIN 2 G: 1 INJECTION, POWDER, FOR SOLUTION INTRAMUSCULAR; INTRAVENOUS; PARENTERAL at 08:23

## 2017-06-27 RX ADMIN — CELECOXIB 200 MG: 200 CAPSULE ORAL at 21:50

## 2017-06-27 RX ADMIN — DEXAMETHASONE SODIUM PHOSPHATE 10 MG: 4 INJECTION, SOLUTION INTRA-ARTICULAR; INTRALESIONAL; INTRAMUSCULAR; INTRAVENOUS; SOFT TISSUE at 08:48

## 2017-06-27 RX ADMIN — ASPIRIN 325 MG: 325 TABLET, DELAYED RELEASE ORAL at 21:50

## 2017-06-27 RX ADMIN — TRANEXAMIC ACID 1000 MG: 100 INJECTION, SOLUTION INTRAVENOUS at 08:40

## 2017-06-27 RX ADMIN — SODIUM CHLORIDE, SODIUM LACTATE, POTASSIUM CHLORIDE, AND CALCIUM CHLORIDE: 600; 310; 30; 20 INJECTION, SOLUTION INTRAVENOUS at 08:15

## 2017-06-27 RX ADMIN — MIDAZOLAM HYDROCHLORIDE 1 MG: 1 INJECTION, SOLUTION INTRAMUSCULAR; INTRAVENOUS at 08:15

## 2017-06-27 RX ADMIN — SODIUM CHLORIDE, SODIUM LACTATE, POTASSIUM CHLORIDE, AND CALCIUM CHLORIDE: 600; 310; 30; 20 INJECTION, SOLUTION INTRAVENOUS at 08:35

## 2017-06-27 RX ADMIN — HYDROMORPHONE HYDROCHLORIDE 0.5 MG: 2 INJECTION, SOLUTION INTRAMUSCULAR; INTRAVENOUS; SUBCUTANEOUS at 11:21

## 2017-06-27 RX ADMIN — MIDAZOLAM HYDROCHLORIDE 1 MG: 1 INJECTION, SOLUTION INTRAMUSCULAR; INTRAVENOUS at 08:35

## 2017-06-27 RX ADMIN — PROPOFOL 75 MCG/KG/MIN: 10 INJECTION, EMULSION INTRAVENOUS at 08:29

## 2017-06-27 RX ADMIN — FENTANYL CITRATE 50 MCG: 50 INJECTION, SOLUTION INTRAMUSCULAR; INTRAVENOUS at 08:23

## 2017-06-27 RX ADMIN — HYDROMORPHONE HYDROCHLORIDE 0.5 MG: 2 INJECTION, SOLUTION INTRAMUSCULAR; INTRAVENOUS; SUBCUTANEOUS at 11:26

## 2017-06-27 RX ADMIN — PROMETHAZINE HYDROCHLORIDE 12.5 MG: 25 INJECTION INTRAMUSCULAR; INTRAVENOUS at 18:14

## 2017-06-27 RX ADMIN — TUBERCULIN PURIFIED PROTEIN DERIVATIVE 5 UNITS: 5 INJECTION, SOLUTION INTRADERMAL at 06:37

## 2017-06-27 RX ADMIN — LIDOCAINE HYDROCHLORIDE 0.1 ML: 10 INJECTION, SOLUTION INFILTRATION; PERINEURAL at 06:38

## 2017-06-27 RX ADMIN — BUPIVACAINE HYDROCHLORIDE 2 ML: 7.5 INJECTION, SOLUTION INTRASPINAL at 08:26

## 2017-06-27 RX ADMIN — ONDANSETRON 4 MG: 2 INJECTION INTRAMUSCULAR; INTRAVENOUS at 14:22

## 2017-06-27 RX ADMIN — FENTANYL CITRATE 100 MCG: 50 INJECTION, SOLUTION INTRAMUSCULAR; INTRAVENOUS at 07:49

## 2017-06-27 RX ADMIN — ACETAMINOPHEN 1000 MG: 10 INJECTION, SOLUTION INTRAVENOUS at 17:24

## 2017-06-27 RX ADMIN — Medication 10 ML: at 14:00

## 2017-06-27 NOTE — PROGRESS NOTES
Problem: Mobility Impaired (Adult and Pediatric)  Goal: *Acute Goals and Plan of Care (Insert Text)  GOALS (1-4 days):  (1.)Ms. Vicki Pardo will move from supine to sit and sit to supine in bed with STAND BY ASSIST.  (2.)Ms. Vicki Pardo will transfer from bed to chair and chair to bed with STAND BY ASSIST using the least restrictive device. (3.)Ms. Vicki Pardo will ambulate with STAND BY ASSIST for 200 feet with the least restrictive device. (4.)Ms. Vicki Pardo will ambulate up/down 3 steps with bilateral railing with CONTACT GUARD ASSIST with no device. (5.)Ms. Vicki Pardo will increase right knee ROM to 5°-80°.  ________________________________________________________________________________________________       PHYSICAL THERAPY JOINT CAMP TKA: INITIAL ASSESSMENT 6/27/2017  INPATIENT: Hospital Day: 1  Payor: Claudene Reel / Plan: [facility] / Product Type: PPO /      NAME/AGE/GENDER: Triston Chau is a 57 y.o. female             PRIMARY DIAGNOSIS:  Unilateral primary osteoarthritis, right knee [M17.11]              Procedure(s) and Anesthesia Type:     * KNEE ARTHROPLASTY TOTAL/ RIGHT/ SAIRA - Spinal (Right)  ICD-10: Treatment Diagnosis:        · Pain in Right Knee (M25.561)  · Stiffness of Right Knee, Not elsewhere classified (M25.661)  · Difficulty in walking, Not elsewhere classified (R26.2)  · Other abnormalities of gait and mobility (R26.89)       ASSESSMENT:      Ms. Vicki Pardo presents with decreased strength and ROM R LE and limited functional mobility S/P R TKA. She will benefit from skilled therapy services to address the below problem list.       This section established at most recent assessment   PROBLEM LIST (Impairments causing functional limitations):  1. Decreased Strength  2. Decreased ADL/Functional Activities  3. Decreased Transfer Abilities  4. Decreased Ambulation Ability/Technique  5. Decreased Flexibility/Joint Mobility  6.  Decreased [place] with Home Exercise Program    INTERVENTIONS PLANNED: (Benefits and precautions of physical therapy have been discussed with the patient.)  1. Bed Mobility  2. Gait Training  3. Home Exercise Program (HEP)  4. Therapeutic Exercise/Strengthening  5. Transfer Training  6. Range of Motion: active/assisted/passive  7. Therapeutic Activities  8. Group Therapy      TREATMENT PLAN: Frequency/Duration: Follow patient BID   to address above goals. Rehabilitation Potential For Stated Goals: GOOD      RECOMMENDED REHABILITATION/EQUIPMENT: (at time of discharge pending progress): Continue Skilled Therapy and Home Health: Physical Therapy. HISTORY:   History of Present Injury/Illness (Reason for Referral):  S/P R TKA  Past Medical History/Comorbidities:   Ms. Shayne Cross  has a past medical history of Migraine and Osteopenia. She also has no past medical history of Adverse effect of anesthesia; Difficult intubation; Malignant hyperthermia due to anesthesia; Nausea & vomiting; or Pseudocholinesterase deficiency. Ms. Shayne Cross  has a past surgical history that includes orthopaedic (1996) and hysterectomy. Social History/Living Environment:   Home Environment: Private residence  # Steps to Enter: 2  One/Two Story Residence: One story  Living Alone: No  Support Systems: Spouse/Significant Other/Partner, Child(katharine)  Patient Expects to be Discharged to[de-identified] Private residence  Current DME Used/Available at Home: [address], bedside, [address] chair  Tub or Shower Type: Shower  Prior Level of Function/Work/Activity:  Independent with gait and ADLs. Number of Personal Factors/Comorbidities that affect the Plan of Care: 0: LOW COMPLEXITY   EXAMINATION:   Most Recent Physical Functioning:      Gross Assessment  AROM: Within functional limits (except R knee; S/P R TKA)  Strength: Within functional limits (except R knee; S/P R TKA)         RLE AROM  R Knee Flexion: 65 (approximate)  R Knee Extension: -15                    Transfers  Sit to Stand: Minimum assistance; Additional time;Assist x1;Assist x2  Stand to Sit: Minimum assistance; Additional time;Assist x1;Assist x2     Balance  Sitting: Intact  Standing: Pull to stand; With support                Weight Bearing Status  Right Side Weight Bearing: As tolerated  Distance (ft): 5 Feet (ft) (side steps)  Ambulation - Level of Assistance: Minimal assistance;Assist x1;Assist x2; Additional time  Assistive Device: Walker, rolling  Speed/Susie: Slow  Stance: Right decreased  Gait Abnormalities: Antalgic  Interventions: Manual cues; Verbal cues; Safety awareness training      Braces/Orthotics: none     Right Knee Cold  Type: Cryocuff       Body Structures Involved:  1. Bones  2. Joints  3. Muscles Body Functions Affected:  1. Neuromusculoskeletal  2. Movement Related Activities and Participation Affected:  1. General Tasks and Demands  2. Mobility  3. Self Care   Number of elements that affect the Plan of Care: 3: MODERATE COMPLEXITY   CLINICAL PRESENTATION:   Presentation: Stable and uncomplicated: LOW COMPLEXITY   CLINICAL DECISION MAKIN[address] [place] [id number] AM-PAC 6 Clicks   Basic Mobility Inpatient Short Form  How much difficulty does the patient currently have. .. Unable A Lot A Little None   1. Turning over in bed (including adjusting bedclothes, sheets and blankets)? [ ] 1   [ ] 2   [ ] 3   [X] 4   2. Sitting down on and standing up from a chair with arms ( e.g., wheelchair, bedside commode, etc.)   [ ] 1   [ ] 2   [X] 3   [ ] 4   3. Moving from lying on back to sitting on the side of the bed? [ ] 1   [ ] 2   [X] 3   [ ] 4   How much help from another person does the patient currently need. .. Total A Lot A Little None   4. Moving to and from a bed to a chair (including a wheelchair)? [ ] 1   [ ] 2   [X] 3   [ ] 4   5. Need to walk in hospital room? [ ] 1   [ ] 2   [X] 3   [ ] 4   6. Climbing 3-5 steps with a railing? [ ] 1   [X] 2   [ ] 3   [ ] 4   © , Trustees of [address] [place] [address], under license to [employer]. All rights reserved       Score:  Initial: 18 Most Recent: X (Date: -- )     Interpretation of Tool:  Represents activities that are increasingly more difficult (i.e. Bed mobility, Transfers, Gait). Score 24 23 22-20 19-15 14-10 9-7 6       Modifier CH CI CJ CK CL CM CN         · Mobility - Walking and Moving Around:               - CURRENT STATUS:    CK - 40%-59% impaired, limited or restricted               - GOAL STATUS:           CJ - 20%-39% impaired, limited or restricted               - D/C STATUS:                       ---------------To be determined---------------  Payor: BLUE CROSS / Plan: [place]UE CROSS [facility] / Product Type: PPO /       Medical Necessity:     · Patient demonstrates good rehab potential due to higher previous functional level. Reason for Services/Other Comments:  · Patient continues to require present interventions due to patient's inability to perform functional mobility and exercises independently.    Use of outcome tool(s) and clinical judgement create a POC that gives a: Clear prediction of patient's progress: LOW COMPLEXITY                 TREATMENT:   (In addition to Assessment/Re-Assessment sessions the following treatments were rendered)      Pre-treatment Symptoms/Complaints:  Nausea and lightheadedness  Pain: Initial:   Pain Intensity 1: 3  Pain Location 1: Knee  Pain Orientation 1: Right  Pain Intervention(s) 1: Cold pack, Repositioned  Post Session:  3      Assessment/Reassessment only, no treatment provided today        Date:    Date:    Date:      ACTIVITY/EXERCISE AM PM AM PM AM PM   GROUP THERAPY  [ ]  [ ]  [ ]  [ ]  [ ]  [ ]   Ankle Pumps               Quad Sets               Gluteal Sets               Hip ABd/ADduction               Straight Leg Raises               Knee Slides               Short Arc Quads               Long Arc Quads               Chair Slides                               B = bilateral; AA = active assistive; A = active; P = passive       Treatment/Session Assessment:         Response to Treatment:  Tolerated well with exception of report of nausea and lightheadedness. Education:  [ ] Home Exercises  [X] Fall Precautions  [ ] Hip Precautions [ ] Going Home Video  [ ] Knee/Hip Prosthesis Review  [X] Walker Management/Safety [ ] Adaptive Equipment as Needed         Interdisciplinary Collaboration:   · Physical Therapist  · Occupational Therapist  · Registered Nurse     After treatment position/precautions:   · Supine in bed  · Bed/Chair-wheels locked  · Bed in low position  · Call light within reach  · Family at bedside  · Side rails x 3     Compliance with Program/Exercises: Will assess as treatment progresses. Recommendations/Intent for next treatment session:  Treatment next visit will focus on increasing Ms. Mota's independence with bed mobility, transfers, gait training, strength/ROM exercises, modalities for pain, and patient education.        Total Treatment Duration:  PT Patient Time In/Time Out  Time In: 1430  Time Out: 62 Darren Calderon PT

## 2017-06-27 NOTE — PROGRESS NOTES
Pt denies n/v now after Im Phenergan. Ginger ale and kathryn provided. inst pt to call for any needs.

## 2017-06-27 NOTE — PERIOP NOTES
TRANSFER - OUT REPORT:    Verbal report given to Vinod Franks RN on Christine Steward  being transferred to  for routine post - op       Report consisted of patients Situation, Background, Assessment and   Recommendations(SBAR). Information from the following report(s) OR Summary, Procedure Summary, Intake/Output and MAR was reviewed with the receiving nurse. Opportunity for questions and clarification was provided. Patient transported on room air.

## 2017-06-27 NOTE — ANESTHESIA PREPROCEDURE EVALUATION
Anesthetic History   No history of anesthetic complications            Review of Systems / Medical History  Pertinent labs reviewed    Pulmonary  Within defined limits                 Neuro/Psych         Headaches (migraine)     Cardiovascular  Within defined limits                Exercise tolerance: >4 METS     GI/Hepatic/Renal  Within defined limits              Endo/Other      Hypothyroidism       Other Findings            Physical Exam    Airway  Mallampati: II  TM Distance: 4 - 6 cm  Neck ROM: normal range of motion   Mouth opening: Normal     Cardiovascular  Regular rate and rhythm,  S1 and S2 normal,  no murmur, click, rub, or gallop             Dental    Dentition: Caps/crowns     Pulmonary  Breath sounds clear to auscultation               Abdominal  GI exam deferred       Other Findings            Anesthetic Plan    ASA: 2  Anesthesia type: spinal      Post-op pain plan if not by surgeon: peripheral nerve block single    Induction: Intravenous  Anesthetic plan and risks discussed with: Patient

## 2017-06-27 NOTE — PROGRESS NOTES
[facility]  Face to Face Encounter    Patients Name: Fernie Nguyen    YOB: 1955    Ordering Physician: Kwesi Garcia    Primary Diagnosis: Unilateral primary osteoarthritis, right knee [M17.11]  S/p right TKA    Date of Face to Face:   6/27/2017                                  Face to Face Encounter findings are related to primary reason for home care:   yes. 1. I certify that the patient needs intermittent care as follows: physical therapy: gait/stair training    2. I certify that this patient is homebound, that is: 1) patient requires the use of a walker device, special transportation, or assistance of another to leave the home; or 2) patient's condition makes leaving the home medically contraindicated; and 3) patient has a normal inability to leave the home and leaving the home requires considerable and taxing effort. Patient may leave the home for infrequent and short duration for medical reasons, and occasional absences for non-medical reasons. Homebound status is due to the following functional limitations: Patient's ambulation limited secondary to severe pain and requires the use of an assistive device and the assistance of a caregiver for safe completion. Patient with strength and ROM deficits limiting ambulation endurance requiring the use of an assistive device and the assistance of a caregiver. Patient deemed temporarily homebound secondary to increased risk for infection when leaving home and going out into the community. 3. I certify that this patient is under my care and that I, or a nurse practitioner or [phone], or clinical nurse specialist, or certified nurse midwife, working with me, had a Face-to-Face Encounter that meets the physician Face-to-Face Encounter requirements.   The following are the clinical findings from the [address] [place] [address] encounter that support the need for skilled services and is a summary of the encounter: see hospital chart        Shereen Mccracken Lauren Ruby, CAREN  6/27/2017      THE FOLLOWING TO BE COMPLETED BY THE COMMUNITY PHYSICIAN:    I concur with the findings described above from the F2F encounter that this patient is homebound and in need of a skilled service.     Certifying Physician: _____________________________________      Printed Certifying Physician Name: _____________________________________    Date: _________________

## 2017-06-27 NOTE — ROUTINE PROCESS
TRANSFER - IN REPORT:    Verbal report received from Izzy Barclay RN on Theora Drop  being received from [facility] for routine post - op      Report consisted of patients Situation, Background, Assessment and   Recommendations(SBAR). Information from the following report(s) SBAR was reviewed with the receiving nurse. Opportunity for questions and clarification was provided. Assessment completed upon patients arrival to unit and care assumed.

## 2017-06-27 NOTE — PROGRESS NOTES
TRANSFER - IN REPORT:    Verbal report received from Everardo Strange rn(name) on Christine Steward  being received from PACU(unit) for routine progression of care      Report consisted of patients Situation, Background, Assessment and   Recommendations(SBAR). Information from the following report(s) SBAR, Procedure Summary, Intake/Output, MAR and Recent Results was reviewed with the receiving nurse. Opportunity for questions and clarification was provided. Assessment completed upon patients arrival to unit and care assumed.          Sarmad Maynard

## 2017-06-27 NOTE — OP NOTES
CR Total Knee Procedure Note    Karson Lei  MRN: [id number],  : 1955    Pre-operative Diagnosis:  Unilateral primary osteoarthritis, right knee [M17.11]    Post-operative Diagnosis: Unilateral primary osteoarthritis, right knee [M17.11]  Location: [place] [address]    Procedure: Right Cementless/Cemented Total Knee Arthroplasty    Date of Surgery: 2017   BMI: Body mass index is 22.04 kg/(m^2). Surgeon: Grant Dexter MD  Assistant: Aarti Raines PA-C   Anesthesia: Spinal, and nerve block    Tourniquet Time: none  EBL:less than 200 cc    Procedure: Total Knee Arthroplasty using ShomoLive Total Knee System  The complexity of the total joint surgery requires the use of a skilled first assistant for positioning, retraction and assistance in closure. Karson Lei was brought to the operating room and positioned on the operating table. Anesthesia was administered. A ingram catheter was inserted preoperatively and IV antibiotics were administered along with transexamic acid. The right  leg was prepped and draped in the usual sterile manner. Prior to the incision being made a timeout  identifying the patient, procedure, operative side and surgeon was verbalized and documented by the OR nurse. The tourniquet was not inflated. An anterior longitudinal incision was accomplished just medial to the tibial tubercle and extending approximal 6 centimeters proximal to the superior pole of the patella. A medial parapatellar capsular incision was performed. The medial capsular flap was elevated around to the insertion of the semimembranous tendon. The tibial screw was noted with bone overgrowth and not thought to be in the way of tibial implantation. It was left so as not to cause bone loss in the proximal tibial area. The patella was everted and the knee flexed and externally rotated. The medial and lateral menisci were excised.   The lateral half of the fat pad was excised and the patella femoral ligament was released. The anterior cruciate ligament was resected and the posterior cruciate ligament was retained. Using extramedullary instrumentation, the tibial cut was accomplished with the appropriate posterior slope. Approximately 6 mm of bone was removed from the high side of the tibia. The distal femur was next addressed. A drill hole was made above the intracondylar notch. Using appropriate intramedullary instrumentation,an appropriate valgus distal cut was accomplished. The femur was sized to a size 4 component. The anterior and posterior condylar cuts were then made about the distal femur as well as the anterior and posterior chamfer cuts utilizing the same cutting block. The osteophytes were removed from the tibial and femoral surfaces. The flexion and extension gaps were deemed appropriately balanced, with appropriate lateral translation accomplished for the patellofemoral groove. The tibia was sized to a 4 component. The tibial base plate was pinned into place with the appropriate external rotation and stem site prepared. A preliminary range of motion was accomplished with the above sized trial components. A 9 millimeter polyethylene insert allowed the patient to obtain full extension as well as appropriate flexion. The patient's ligaments were stable in flexion and extension to medial and lateral stressing and the alignment was through the appropriate mechanical axis. Additional soft tissue releases were none. The patella was then everted. The bone was resected to accomodate the size 31 patella button. A trial reduction revealed appropriate tracking through the patellofemoral groove with no lateral retinacular release being accomplished. Two holes were drilled to accommodate the femoral pegs. All trial components were removed and the cut surfaces prepared for implantaion with irrigation and debridement of the bone interstices.   There were no femoral deficiencies. There were tibial deficiencies that were bonegraft. No augmentation was utilized. 10 cc of tranexamic acid was placed in the femoral canal and a bone plug used to seal the canal.    The posterior capsule and the surrounding periosteum and soft tissues were injected for pain management. The femoral and tibial components were impacted and pressurized in full extension as well as 70 degrees of flexion. The tibia had some soft tissue defects from her prior ACL so cement was utilized to stabilize the tibial component. The patella component was pressurized using the patella clamp. A lavage of diluted betadine solution of 17.5 ml Betadine in 500 of 0.9% Normal Saline was allowed to soak in the wound for 3 minutes after implanting of the prosthesis. The wound was irrigated with Saline again before closure. Prior to the final skin closure, full strength betadine was applied to the skin surrounding the skin incision. Panda Mota's knee was placed through a range of motion and noted to be stable as mentioned above. The operative knee was injected prior to closure for post op pain management. 50 cc of transexamic acid was injected into the capsule for hemostasis. The capsular layer was closed using a #1 PDS suture, while the subcutaneous layers were closed using a 2-0 Monocryl interrupted suture. Finally, the skin was closed using staples and a sterile bandage was applied. An Iceman cryo pad was applied on the operative leg. The sponge count and needle counts were correct. Implants:   Implant Name Type Inv.  Item Serial No.  Lot No. LRB No. Used   CEMENT BNE HV R 40GM -- PALACOS - [id number]  CEMENT BNE HV R 40GM -- PALACOS [id number] [employer] [id number] Right 1   COMPNT FEM CR TRIATHLN 4 R PA --  - [id number]  COMPNT FEM CR TRIATHLN 4 R PA --  [id number] [employer] HOW [id number] Right 1   patella   AMKA  AMKA Right 1   INSERT TIB ARTC BRNG SZ4 9MM -- TRIATHLON - [id number] INSERT TIB ARTC BRNG SZ4 9MM -- TRIATHLON [id number] [employer] HOWM [id number] Right 1   BASEPLT TIB PC TRITNM SZ 4 -- TRIATHLON - [id number]   BASEPLT TIB PC TRITNM SZ 4 -- TRIATHLON [id number] [employer] HOWM [id number] Right 1     Signed By: Jeremías Marquez

## 2017-06-27 NOTE — PROGRESS NOTES
Care Management Interventions  Mode of Transport at Discharge: Self  Transition of Care Consult (CM Consult): [address]: Yes  Discharge Durable Medical Equipment: No  Physical Therapy Consult: Yes  Occupational Therapy Consult: Yes  Current Support Network: Lives with Spouse  Confirm Follow Up Transport: Family  Plan discussed with Pt/Family/Caregiver: Yes  Freedom of Choice Offered: Yes  Discharge Location  Discharge Placement: Home with home health  Patient is a 57y.o. year old female admitted for Right TKA . Patient lives with Her spouse and plans to return home on discharge. Order received to arrange home health. Patient's dtr who is an OT,  [facility] but patient has decided to [facility] due to continuity of care. Referral sent to Fred souza/  [facility]. Patient denies any equipment needs as she has a walker and bedside commode. Will follow until discharge.   Tiera Hoover

## 2017-06-27 NOTE — ANESTHESIA PROCEDURE NOTES
Peripheral Block    Start time: 6/27/2017 7:49 AM  End time: 6/27/2017 7:52 AM  Performed by: Dany Lanier  Authorized by: Dany Lanier       Pre-procedure: Indications: at surgeon's request and post-op pain management    Preanesthetic Checklist: patient identified, risks and benefits discussed, site marked, timeout performed, anesthesia consent given and patient being monitored    Timeout Time: 07:49          Block Type:   Block Type: Adductor canal  Laterality:  Right  Monitoring:  Standard ASA monitoring, continuous pulse ox, frequent vital sign checks, heart rate, oxygen and responsive to questions  Injection Technique:  Single shot  Procedures: ultrasound guided    Prep: chlorhexidine    Location:  Mid thigh  Needle Type:  Stimuplex  Needle Gauge:  20 G  Needle Localization:  Ultrasound guidance  Medication Injected:  0.2%  ropivacaine  Adds:  Epi 1:400K  Volume (mL):  30    Assessment:  Number of attempts:  1  Injection Assessment:  Incremental injection every 5 mL, local visualized surrounding nerve on ultrasound, negative aspiration for blood, no intravascular symptoms, no paresthesia and ultrasound image on chart  Patient tolerance:  Patient tolerated the procedure well with no immediate complications  3 cc 1% Lidocaine injected at needle insertion site.

## 2017-06-27 NOTE — PERIOP NOTES
TRANSFER - OUT REPORT:    Verbal report given to Lizabeth Kline RN  on Kelly Granados  being transferred to block holding area for routine progression of care       Report consisted of patients Situation, Background, Assessment and   Recommendations(SBAR). Information from the following report(s) Kardex, MAR, Recent Results and Med Rec Status was reviewed with the receiving nurse. Lines:   Peripheral IV 06/27/17 Left Forearm (Active)   Site Assessment Clean, dry, & intact 6/27/2017  6:39 AM   Phlebitis Assessment 0 6/27/2017  6:39 AM   Infiltration Assessment 0 6/27/2017  6:39 AM   Dressing Status Clean, dry, & intact 6/27/2017  6:39 AM   Dressing Type Tape;Transparent 6/27/2017  6:39 AM   Hub Color/Line Status Infusing 6/27/2017  6:39 AM   Action Taken Blood drawn 6/27/2017  6:39 AM        Opportunity for questions and clarification was provided.       Patient transported with:   [email or web address]

## 2017-06-27 NOTE — ANESTHESIA PROCEDURE NOTES
Spinal Block    Performed by: Peterson Gastelum  Authorized by: Peterson Gastelum     Pre-procedure: Indications: primary anesthetic  Preanesthetic Checklist: patient identified, risks and benefits discussed, anesthesia consent, patient being monitored and timeout performed    Timeout Time: 08:19          Spinal Block:   Patient Position:  Seated  Prep Region:  Lumbar  Prep: chlorhexidine and patient draped      Location:  L3-4  Technique:  Single shot  Local:  Lidocaine 1%  Local Dose (mL):  3    Needle:   Needle Type:  Pencan  Needle Gauge:  25 G  Attempts:  1      Events: CSF confirmed, no blood with aspiration and no paresthesia        Assessment:  Insertion:  Uncomplicated  Patient tolerance:  Patient tolerated the procedure well with no immediate complications  IV noted to be infiltrated just after prep and drape, restarted by CRNA prior to SAB.

## 2017-06-27 NOTE — ANESTHESIA POSTPROCEDURE EVALUATION
Post-Anesthesia Evaluation and Assessment    Patient: Erendira Em MRN: [id number]  SSN: [id number]    YOB: 1955  Age: 57 y.o. Sex: female       Cardiovascular Function/Vital Signs  Visit Vitals    BP 91/53    Pulse 74    Temp 37 °C (98.6 °F)    Resp 16    Ht 5' 5.5\" (1.664 m)    Wt 61 kg (134 lb 7.7 oz)    SpO2 97%    BMI 22.04 kg/m2       Patient is status post spinal anesthesia for Procedure(s):  KNEE ARTHROPLASTY TOTAL/ RIGHT/ SAIRA. Nausea/Vomiting: None    Postoperative hydration reviewed and adequate. Pain:  Pain Scale 1: Numeric (0 - 10) (06/27/17 1126)  Pain Intensity 1: 3 (06/27/17 1126)   Managed    Neurological Status:   Neuro (WDL): Exceptions to WDL (06/27/17 1041)  Neuro  Neurologic State: Alert (06/27/17 1041)  Orientation Level: Oriented X4 (06/27/17 1041)  LLE Motor Response: Tingling;Weak (06/27/17 1041)  RLE Motor Response: Tingling;Weak (06/27/17 1041)   At baseline    Mental Status and Level of Consciousness: Awake. Pulmonary Status:   O2 Device: Room air (06/27/17 1126)   Adequate oxygenation and airway patent    Complications related to anesthesia: None    Post-anesthesia assessment completed.  No concerns    Signed By: Tulio Spencer MD     June 27, 2017

## 2017-06-27 NOTE — IP AVS SNAPSHOT
[address] 
[phone] Patient: Anuj Treviño MRN: [id number] SSP:[id number]/86/[id number] You are allergic to the following Allergen Reactions Morphine Itching Immunizations Administered for This Admission Name Date  
 TB Skin Test (PPD) Intradermal 6/27/2017 Recent Documentation Height Weight Breastfeeding? BMI OB Status Smoking Status 1.664 m 61 kg No 22.04 kg/m2 Hysterectomy Former Smoker Unresulted Labs Order Current Status TYPE & SCREEN Preliminary result Emergency Contacts Name Discharge Info Relation Home Work Mobile Tree Mota DISCHARGE CAREGIVER [3] Spouse [3] [phone] Christina Alves CAREGIVER [3] Daughter [21] [phone] About your hospitalization You were admitted on:  June 27, 2017 You last received care in the:  [facility] You were discharged on:  June 29, 2017 Unit phone number:  [phone] Why you were hospitalized Your primary diagnosis was:  S/P Total Knee Arthroplasty Your diagnoses also included: Arthritis Of Knee, Right Providers Seen During Your Hospitalizations Provider Role Specialty Primary office phone Ivie Collet, MD Attending Provider Orthopedic Surgery [phone] Your Primary Care Physician (PCP) Primary Care Physician Office Phone Office Fax 9628 [facility], [address] [place] [address] Road [phone] Follow-up Information Follow up With Details Comments Contact Info Rayna Barr MD  As needed [address] Zoraida Currie [address] [address] [facility] [id number] [phone] Ivie Collet, MD  As scheduled by office [address] [address] [phone] [address]  Will contact you within 48 hrs [place] [address] [address] [facility] [address] 
[phone] Current Discharge Medication List  
  
START taking these medications Dose & Instructions Dispensing Information Comments Morning Noon Evening Bedtime  
 aspirin delayed-release 325 mg tablet Replaces:  aspirin 325 mg tablet Your next dose is: Today Dose:  325 mg Take 1 Tab by mouth every twelve (12) hours every twelve (12) hours. Quantity:  120 Tab Refills:  0  
     
   
   
  
   
  
 oxyCODONE IR 10 mg Tab immediate release tablet Commonly known as:  Sugar Peotone Your next dose is: Today Dose:  10 mg Take 1 Tab by mouth every four (4) hours as needed. Max Daily Amount: 60 mg.  
 Quantity:  60 Tab Refills:  0  
     
   
   
  
   
  
 promethazine 25 mg tablet Commonly known as:  PHENERGAN Your next dose is: Today Dose:  25 mg Take 1 Tab by mouth every six (6) hours as needed for Nausea. Quantity:  50 Tab Refills:  0 CONTINUE these medications which have NOT CHANGED Dose & Instructions Dispensing Information Comments Morning Noon Evening Bedtime  
 levothyroxine 125 mcg tablet Commonly known as:  SYNTHROID Your next dose is:  Tomorrow Dose:  125 mcg Take 125 mcg by mouth Daily (before breakfast). Indications: hypothyroidism Refills:  0 STOP taking these medications   
 aspirin 325 mg tablet Commonly known as:  ASPIRIN Replaced by:  aspirin delayed-release 325 mg tablet Where to Get Your Medications Information on where to get these meds will be given to you by the nurse or doctor. ! Ask your nurse or doctor about these medications  
  aspirin delayed-release 325 mg tablet  
 oxyCODONE IR 10 mg Tab immediate release tablet  
 promethazine 25 mg tablet Discharge Instructions [facility] [employer] Patient Discharge Instructions Kit Givens / [id number] : 1955 Admitted 6/27/2017 Discharged: 6/28/2017 IF YOU HAVE ANY PROBLEMS ONCE YOU ARE AT HOME CALL THE FOLLOWING NUMBERS:  
Main office number: [phone] Take Home Medications · It is important that you take the medication exactly as they are prescribed. · Keep your medication in the bottles provided by the pharmacist and keep a list of the medication names, dosages, and times to be taken in your wallet. · Do not take other medications without consulting your doctor. What to do at [facility] Resume your prehospital diet. If you have excessive nausea or vomitting call your doctor's office Home Physical Therapy is arranged. Use rolling walker when walking. Use Ambrose Hose stockings until we see you in the office for your follow up appointment with Dr. Nigel Capone Patients who have had a joint replacement should not drive until you are seen for your follow up appointment by Dr. Nigel Capone. When to Call - Call if you have a temperature greater then 101 
- Unable to keep food down - Loose control of your bladder or bowel function - Are unable to bear any weight  
- Need a pain medication refill DISCHARGE SUMMARY from Nurse The following personal items collected during your admission are returned to you:  
Dental Appliance: Dental Appliances: None Vision: Visual Aid: Glasses Hearing Aid:   na 
Jewelry: Jewelry: Ring, Earrings (Rings x2 and earrings ) Clothing: Clothing: At bedside, Footwear, Pants, Shirt, Socks, Undergarments Other Valuables: Other Valuables: Cell Phone, Eyeglasses Valuables sent to safe:   na 
 
PATIENT INSTRUCTIONS: 
 
After general anesthesia or intravenous sedation, for 24 hours or while taking prescription Narcotics: · Limit your activities · Do not drive and operate hazardous machinery · Do not make important personal or business decisions · Do  not drink alcoholic beverages · If you have not urinated within 8 hours after discharge, please contact your surgeon on call. Report the following to your surgeon: 
· Excessive pain, swelling, redness or odor of or around the surgical area · Temperature over 101 · Nausea and vomiting lasting longer than 4 hours or if unable to take medications · Any signs of decreased circulation or nerve impairment to extremity: change in color, persistent  numbness, tingling, coldness or increase pain · Any questions, call office @ [phone] Keep scheduled follow up appointment. If need to change, call office @ [phone]. *  Please give a list of your current medications to your Primary Care Provider. *  Please update this list whenever your medications are discontinued, doses are 
    changed, or new medications (including over-the-counter products) are added. *  Please carry medication information at all times in case of emergency situations. Total Knee Replacement: What to Expect at Home Your Recovery When you leave the hospital, you should be able to move around with a walker or crutches. But you will need someone to help you at home for the next few weeks or until you have more energy and can move around better. If there is no one to help you at home, you may go to a rehabilitation center. You will go home with a bandage and stitches or staples. Change the bandage as your doctor tells you to. Your doctor will remove your stitches or staples 10 to 21 days after your surgery. You may still have some mild pain, and the area may be swollen for 3 to 6 months after surgery. Your knee will continue to improve for 6 to 12 months. You will probably use a walker for 1 to 3 weeks and then use crutches. When you are ready, you can use a cane. You will probably be able to walk on your own in 4 to 8 weeks. You will need to do months of physical rehabilitation (rehab) after a knee replacement.  Rehab will help you strengthen the muscles of the knee and help you regain movement. After you recover, your artificial knee will allow you to do normal daily activities with less pain or no pain at all. You may be able to hike, dance, ride a bike, and play golf. Talk to your doctor about whether you can do more strenuous activities. Always tell your caregivers that you have an artificial knee. How long it will take to walk on your own, return to normal activities, and go back to work depends on your health and how well your rehabilitation (rehab) program goes. The better you do with your rehab exercises, the quicker you will get your strength and movement back. This care sheet gives you a general idea about how long it will take for you to recover. But each person recovers at a different pace. Follow the steps below to get better as quickly as possible. How can you care for yourself at home? Activity · Rest when you feel tired. You may take a nap, but do not stay in bed all day. When you sit, use a chair with arms. You can use the arms to help you stand up. · Work with your physical therapist to find the best way to exercise. You may be able to take frequent, short walks using crutches or a walker. What you can do as your knee heals will depend on whether your new knee is cemented or uncemented. You may not be able to do certain things for a while if your new knee is uncemented. · After your knee has healed enough, you can do more strenuous activities with caution. ¨ You can golf, but use a golf cart, and do not wear shoes with spikes. ¨ You can bike on a flat road or on a stationary bike. Avoid biking up hills. ¨ Your doctor may suggest that you stay away from activities that put stress on your knee. These include tennis or badminton, squash or racquetball, contact sports like football, jumping (such as in basketball), jogging, or running. ¨ Avoid activities where you might fall. These include horseback riding, skiing, and mountain biking. · Do not sit for more than 1 hour at a time. Get up and walk around for a while before you sit again. If you must sit for a long time, prop up your leg with a chair or footstool. This will help you avoid swelling. · Ask your doctor when you can shower. You may need to take sponge baths until your stitches or staples have been removed. · Ask your doctor when you can drive again. It may take up to 8 weeks after knee replacement surgery before it is safe for you to drive. · When you get into a car, sit on the edge of the seat. Then pull in your legs, and turn to face the front. · You should be able to do many everyday activities 3 to 6 weeks after your surgery. You will probably need to take 4 to 16 weeks off from work. When you can go back to work depends on the type of work you do and how you feel. · Ask your doctor when it is okay for you to have sex. · Do not lift anything heavier than 10 pounds and do not lift weights for 12 weeks. Diet · By the time you leave the hospital, you should be eating your normal diet. If your stomach is upset, try bland, low-fat foods like plain rice, broiled chicken, toast, and yogurt. Your doctor may suggest that you take iron and vitamin supplements. · Drink plenty of fluids (unless your doctor tells you not to). · Eat healthy foods, and watch your portion sizes. Try to stay at your ideal weight. Too much weight puts more stress on your new knee. · You may notice that your bowel movements are not regular right after your surgery. This is common. Try to avoid constipation and straining with bowel movements. You may want to take a fiber supplement every day. If you have not had a bowel movement after a couple of days, ask your doctor about taking a mild laxative. Medicines · Your doctor will tell you if and when you can restart your medicines. He or she will also give you instructions about taking any new medicines. · If you take blood thinners, such as warfarin (Coumadin), clopidogrel (Plavix), or aspirin, be sure to talk to your doctor. He or she will tell you if and when to start taking those medicines again. Make sure that you understand exactly what your doctor wants you to do. · Your doctor may give you a blood-thinning medicine to prevent blood clots. If you take a blood thinner, be sure you get instructions about how to take your medicine safely. Blood thinners can cause serious bleeding problems. This medicine could be in pill form or as a shot (injection). If a shot is necessary, your doctor will tell you how to do this. · Be safe with medicines. Take pain medicines exactly as directed. ¨ If the doctor gave you a prescription medicine for pain, take it as prescribed. ¨ If you are not taking a prescription pain medicine, ask your doctor if you can take an over-the-counter medicine. ¨ Plan to take your pain medicine 30 minutes before exercises. It is easier to prevent pain before it starts than to stop it once it has started. · If you think your pain medicine is making you sick to your stomach: 
¨ Take your medicine after meals (unless your doctor has told you not to). ¨ Ask your doctor for a different pain medicine. · If your doctor prescribed antibiotics, take them as directed. Do not stop taking them just because you feel better. You need to take the full course of antibiotics. Incision care · You will have a bandage over the cut (incision) and staples or stitches. Take the bandage off when your doctor says it is okay. · Your doctor will remove the staples or stitches 10 days to 3 weeks after the surgery and replace them with strips of tape. Leave the tape on for a week or until it falls off. Exercise · Your rehab program will give you a number of exercises to do to help you get back your knee's range of motion and strength. Always do them as your therapist tells you. Ice and elevation · For pain and swelling, put ice or a cold pack on the area for 10 to 20 minutes at a time. Put a thin cloth between the ice and your skin. Other instructions · Continue to wear your support stockings as your doctor says. These help to prevent blood clots. The length of time that you will have to wear them depends on your activity level and the amount of swelling. · Wear medical alert jewelry that says you may need antibiotics before any procedure, including dental work. You can buy this at most [employer]. · You have metal pieces in your knee. These may set off some airport metal detectors. Carry a medical alert card that says you have an artificial joint, just in case. Follow-up care is a key part of your treatment and safety. Be sure to make and go to all appointments, and call your doctor if you are having problems. It's also a good idea to know your test results and keep a list of the medicines you take. When should you call for help? Call 911 anytime you think you may need emergency care. For example, call if: 
· You passed out (lost consciousness). · You have severe trouble breathing. · You have sudden chest pain and shortness of breath, or you cough up blood. Call your doctor now or seek immediate medical care if: 
· You have signs of infection, such as: 
¨ Increased pain, swelling, warmth, or redness. ¨ Red streaks leading from the incision. ¨ Pus draining from the incision. ¨ A fever. · You have signs of a blood clot, such as: 
¨ Pain in your calf, back of the knee, thigh, or groin. ¨ Redness and swelling in your leg or groin. · Your incision comes open and begins to bleed, or the bleeding increases. · You have pain that does not get better after you take pain medicine. Watch closely for changes in your health, and be sure to contact your doctor if: 
· You do not have a bowel movement after taking a laxative. Where can you learn more? Go to http://jacob-jared.info/. Enter N716 in the search box to learn more about \"Total Knee Replacement: What to Expect at Home. \" Current as of: March 21, 2017 Content Version: 11.3 © 1662-5502 [employer]. Care instructions adapted under license by [employer] (which disclaims liability or warranty for this information). If you have questions about a medical condition or this instruction, always ask your healthcare professional. [place] [address] any warranty or liability for your use of this information. These are general instructions for a healthy lifestyle: No smoking/ No tobacco products/ Avoid exposure to second hand smoke Surgeon General's Warning:  Quitting smoking now greatly reduces serious risk to your health. Obesity, smoking, and sedentary lifestyle greatly increases your risk for illness A healthy diet, regular physical exercise & weight monitoring are important for maintaining a healthy lifestyle You may be retaining fluid if you have a history of heart failure or if you experience any of the following symptoms:  Weight gain of 3 pounds or more overnight or 5 pounds in a week, increased swelling in our hands or feet or shortness of breath while lying flat in bed. Please call your doctor as soon as you notice any of these symptoms; do not wait until your next office visit. Recognize signs and symptoms of STROKE: 
 
F-face looks uneven A-arms unable to move or move even S-speech slurred or non-existent T-time-call 911 as soon as signs and symptoms begin-DO NOT go Back to bed or wait to see if you get better-TIME IS BRAIN. The discharge information has been reviewed with the patient. The patient verbalized understanding. Information obtained by : 
I understand that if any problems occur once I am at home I am to contact my physician. I understand and acknowledge receipt of the instructions indicated above. Physician's or R.N.'s Signature                                                                  Date/Time Patient or Representative Signature                                                          Date/Time Discharge Orders Procedure Order Date Status Priority Quantity Spec Type Associated Dx ACTIVITY AFTER DISCHARGE Patient should: Restrict lifting, Restrict driving 88/59/30 5372 Normal Routine 1  Status post total right knee replacement [[id number]] Questions: Patient should:  Restrict lifting Patient should:  Restrict driving DIET REGULAR No added salt 06/28/17 0804 Normal Routine 1  Status post total right knee replacement [[id number]] Questions: Additional options:  No added salt DRESSING, CHANGE SPECIFY 06/28/17 0804 Normal Routine 1  Status post total right knee replacement [[id number]] Comments:  If staples are present they are to be removed and steri strips placed 10-14 days  after surgery as long as wound is healing. If wound does not appear to be healing the patient is to be seen in the office before removing staples. REFERRAL TO HOME HEALTH 06/28/17 0804 Normal Routine 1  Status post total right knee replacement [[id number]] REFERRAL TO PHYSICAL THERAPY 06/28/17 0804 Normal Routine 1  Status post total right knee replacement [[id number]] Introducing [facility]! [facility] introduces [employer] patient portal. Now you can access parts of your medical record, email your doctor's office, and request medication refills online. 1. In your internet browser, go to [email or web address] 2. Click on the First Time User? Click Here link in the Sign In box. You will see the New Member Sign Up page. 3. Enter your [employer] Access Code exactly as it appears below. You will not need to use this code after youve completed the sign-up process. If you do not sign up before the expiration date, you must request a new code. · [employer] Access Code: [email or web address] Expires: 9/6/2017  9:33 AM 
 
4. Enter the last four digits of your Social Security Number (xxxx) and Date of Birth (mm/dd/yyyy) as indicated and click Submit. You will be taken to the next sign-up page. 5. Create a [employer] ID. This will be your [employer] login ID and cannot be changed, so think of one that is secure and easy to remember. 6. Create a [employer] password. You can change your password at any time. 7. Enter your Password Reset Question and Answer. This can be used at a later time if you forget your password. 8. Enter your e-mail address. You will receive e-mail notification when new information is available in [address] 9. Click Sign Up. You can now view and download portions of your medical record. 10. Click the Download Summary menu link to download a portable copy of your medical information. If you have questions, please visit the Frequently Asked Questions section of the [employer] website. Remember, [employer] is NOT to be used for urgent needs. For medical emergencies, dial 911. Now available from your iPhone and Android! General Information Please provide this summary of care documentation to your next provider. Patient Signature:  ____________________________________________________________ Date:  ____________________________________________________________  
  
Rae Burn Provider Signature:  ____________________________________________________________ Date:  ____________________________________________________________

## 2017-06-27 NOTE — H&P
The patient has end stage arthritis of the right knee. The patient was see and examined and there are no changes to the patient's orthopedic condition. They have tried conservative treatment for this condition; including antiinflammatories and lifestyle modifications and have failed. The necessity for the joint replacement is still present, and the H&P from the office is still current. The patient will be admitted today forProcedure(s) (LRB):  KNEE ARTHROPLASTY TOTAL/ RIGHT/ SAIRA (Right) .

## 2017-06-27 NOTE — PROGRESS NOTES
Problem: Self Care Deficits Care Plan (Adult)  Goal: *Acute Goals and Plan of Care (Insert Text)  GOALS:   DISCHARGE GOALS (in preparation for going home/rehab): 3 days  1. Ms. Carlos Espino will perform one lower body dressing activity with minimal assistance required to demonstrate improved functional mobility and safety. 2. Ms. Carlos Espino will perform one lower body bathing activity with minimal assistance required to demonstrate improved functional mobility and safety. 3. Ms. Carlos Espino will perform toileting/toilet transfer with contact guard assistance to demonstrate improved functional mobility and safety. 4. Ms. Carlos Espino will perform shower transfer with contact guard assistance to demonstrate improved functional mobility and safety. JOINT CAMP OCCUPATIONAL THERAPY TKA: Initial Assessment and PM 6/27/2017  INPATIENT: Hospital Day: 1  Payor: [employer] / [place] [facility] / Product Type: PPO /      NAME/AGE/GENDER: Bossman Ordonez is a 57 y.o. female             PRIMARY DIAGNOSIS:  Unilateral primary osteoarthritis, right knee [M17.11]              Procedure(s) and Anesthesia Type:     * KNEE ARTHROPLASTY TOTAL/ RIGHT/ SAIRA - Spinal (Right)  ICD-10: Treatment Diagnosis:        · Pain in Right Knee (M25.561)  · Stiffness of Right Knee, Not elsewhere classified (M25.661)  · Other lack of cordination (R27.8)       ASSESSMENT:      Ms. Carlos Espino is s/p right TKA and presents with decreased weight bearing on right LE and decreased independence with functional mobility and activities of daily living. Patient would benefit from skilled Occupational Therapy to maximize independence and safety with self-care task and functional mobility. Pt would also benefit from education on adaptive equipment and safety precautions in preparation for going home or for recommendations for post-hospital rehab program.  Patient plans for further rehab at home with home health services and good family support .   OT reviewed therapy schedule and plan of care with patient. Patient was able to transfer and preform self care skills as charted below. Patient instructed to call for assistance when needing to get up from the bed and all needs in reach. Patient verbalized understanding of call light. This section established at most recent assessment   PROBLEM LIST (Impairments causing functional limitations):  1. Decreased Strength  2. Decreased ADL/Functional Activities  3. Decreased Transfer Abilities  4. Increased Pain  5. Increased Fatigue  6. Decreased Flexibility/Joint Mobility  7. Decreased Knowledge of Precautions    INTERVENTIONS PLANNED: (Benefits and precautions of occupational therapy have been discussed with the patient.)  1. Activities of daily living training  2. Adaptive equipment training  3. Balance training  4. Clothing management  5. Donning&doffing training  6. Theraputic activity      TREATMENT PLAN: Frequency/Duration: Follow patient 1 time to address above goals. Rehabilitation Potential For Stated Goals: EXCELLENT      RECOMMENDED REHABILITATION/EQUIPMENT: (at time of discharge pending progress): Continue Skilled Therapy and Home Health: Physical Therapy. OCCUPATIONAL PROFILE AND HISTORY:   History of Present Injury/Illness (Reason for Referral): Pt presents this date s/p (right) TKA. Past Medical History/Comorbidities:   Ms. Harman Nicolas  has a past medical history of Migraine and Osteopenia. She also has no past medical history of Adverse effect of anesthesia; Difficult intubation; Malignant hyperthermia due to anesthesia; Nausea & vomiting; or Pseudocholinesterase deficiency. Ms. Harman Nicolas  has a past surgical history that includes orthopaedic (1996) and hysterectomy.   Social History/Living Environment:   Home Environment: Private residence  # Steps to Enter: 2  One/Two Story Residence: One story  Living Alone: No  Support Systems: Spouse/Significant Other/Partner, Child(katharine)  Patient Expects to be Discharged to[de-identified] Private residence  Current DME Used/Available at Home: [employer], bedside, Shower chair  Tub or Shower Type: Shower  Prior Level of Function/Work/Activity:  Indep/modified indep with BADLs and IADLs      Number of Personal Factors/Comorbidities that affect the Plan of Care: Brief history (0):  LOW COMPLEXITY   ASSESSMENT OF OCCUPATIONAL PERFORMANCE[de-identified]   Most Recent Physical Functioning:   Balance  Sitting: Intact  Standing: Pull to stand; With support        Patient Vitals for the past 6 hrs:       BP BP Patient Position SpO2 O2 Flow Rate (L/min) Pulse   06/27/17 1041 90/54 Supine 99 % 2 l/min 80   06/27/17 1046 93/50 - 99 % - 76   06/27/17 1051 100/52 - 99 % - 75   06/27/17 1057 91/53 - 99 % 2 l/min 79   06/27/17 1111 95/54 - 96 % - 77   06/27/17 1121 - - 97 % - -   06/27/17 1126 91/53 - 97 % - 74   06/27/17 1141 (!) 86/53 - 96 % - 62   06/27/17 1156 (!) 87/50 - 94 % - 70   06/27/17 1221 90/49 - 98 % - 69   06/27/17 1416 - - 97 % - -        Gross Assessment  AROM: Within functional limits (except R knee; S/P R TKA)  Strength: Within functional limits (except R knee; S/P R TKA)              Coordination  Fine Motor Skills-Upper: Left Intact; Right Intact  Gross Motor Skills-Upper: Left Intact; Right Intact           Mental Status  Neurologic State: Alert  Orientation Level: Oriented X4  Cognition: Appropriate decision making; Appropriate for age attention/concentration; Appropriate safety awareness; Follows commands  Perception: Appears intact  Perseveration: No perseveration noted  Safety/Judgement: Awareness of environment; Fall prevention            RLE AROM  R Knee Flexion: 65 (approximate)  R Knee Extension: -15       Basic ADLs (From Assessment) Complex ADLs (From Assessment)   Basic ADL  Feeding: Supervision  Oral Facial Hygiene/Grooming: Minimum assistance  Bathing: Moderate assistance  Upper Body Dressing: Minimum assistance  Lower Body Dressing: Moderate assistance  Toileting:  Total assistance     Grooming/Bathing/Dressing Activities of Daily Living     Cognitive Retraining  Safety/Judgement: Awareness of environment; Fall prevention                 Functional Transfers  Toilet Transfer : Moderate assistance  Shower Transfer: Moderate assistance     Bed/Mat Mobility  Sit to Stand: Minimum assistance; Additional time;Assist x1;Assist x2           Physical Skills Involved:  1. Range of Motion  2. Balance  3. Strength Cognitive Skills Affected (resulting in the inability to perform in a timely and safe manner): 1. none Psychosocial Skills Affected:  1. none   Number of elements that affect the Plan of Care: 1-3:  LOW COMPLEXITY   CLINICAL DECISION MAKIN [facility] Mobility Inpatient Short Form  How much help from another person does the patient currently need. .. Total A Lot A Little None   1. Putting on and taking off regular lower body clothing?   [ ] 1   [X] 2   [ ] 3   [ ] 4   2. Bathing (including washing, rinsing, drying)? [ ] 1   [X] 2   [ ] 3   [ ] 4   3. Toileting, which includes using toilet, bedpan or urinal?   [ ] 1   [X] 2   [ ] 3   [ ] 4   4. Putting on and taking off regular upper body clothing?   [ ] 1   [ ] 2   [X] 3   [ ] 4   5. Taking care of personal grooming such as brushing teeth? [ ] 1   [ ] 2   [X] 3   [ ] 4   6. Eating meals? [ ] 1   [ ] 2   [X] 3   [ ] 4   © , Trustees of [address] [place] [id number], under license to [employer]. All rights reserved   Score:  Initial: 15 Most Recent: X (Date: -- )     Interpretation of Tool:  Represents activities that are increasingly more difficult (i.e. Bed mobility, Transfers, Gait).        Score 24 23 22-20 19-15 14-10 9-7 6       Modifier CH CI CJ CK CL CM CN         · Self Care:               - CURRENT STATUS:    CK - 40%-59% impaired, limited or restricted               - GOAL STATUS:           CJ - 20%-39% impaired, limited or restricted               - D/C STATUS: ---------------To be determined---------------  Payor: BLUE CROSS / [place]: [place] [employer] [facility] / Product Type: PPO /       Medical Necessity:     · Patient is expected to demonstrate progress in balance, coordination and functional technique to decrease assistance required with self care and functional mobility. Reason for Services/Other Comments:  · Patient continues to require skilled intervention due to decreased self care and functional mobility. Use of outcome tool(s) and clinical judgement create a POC that gives a: LOW COMPLEXITY                 TREATMENT:   (In addition to Assessment/Re-Assessment sessions the following treatments were rendered)      Pre-treatment Symptoms/Complaints:  3/10 pain  Pain: Initial:   Pain Intensity 1: 3  Pain Location 1: Knee  Pain Orientation 1: Right  Pain Intervention(s) 1: Ambulation/Increased Activity  Post Session:  1/10 iceman in place      Assessment/Reassessment only, no treatment provided today     Treatment/Session Assessment:         Response to Treatment: Tolerated well. Education:  [ ] Home Exercises  [X] Fall Precautions  [ ] Hip Precautions [ ] Going Home Video  [X] Knee/Hip Prosthesis Review  [X] Walker Management/Safety [X] Adaptive Equipment as Needed         Interdisciplinary Collaboration:   · Physical Therapist  · Occupational Therapist  · Registered Nurse     After treatment position/precautions:   · Supine in bed  · Bed/Chair-wheels locked  · Caregiver at bedside  · Call light within reach  · RN notified  · Family at bedside  · Side rails x 3     Compliance with Program/Exercises: compliant most of the time. Recommendations/Intent for next treatment session:  Treatment next visit will focus on increasing Ms. Mota's independence with bed mobility, self care and functional mobility modalities for pain, and patient education.        Total Treatment Duration:  OT Patient Time In/Time Out  Time In: 1420  Time Out: 1430 Christen Shaikh, OT

## 2017-06-28 PROBLEM — Z96.659 S/P TOTAL KNEE ARTHROPLASTY: Status: ACTIVE | Noted: 2017-06-28

## 2017-06-28 LAB
HGB BLD-MCNC: 8.1 G/DL (ref 11.7–15.4)
MM INDURATION POC: NORMAL MM (ref 0–5)
PPD POC: NORMAL NEGATIVE

## 2017-06-28 PROCEDURE — 65270000029 HC RM PRIVATE

## 2017-06-28 PROCEDURE — 36415 COLL VENOUS BLD VENIPUNCTURE: CPT | Performed by: ORTHOPAEDIC SURGERY

## 2017-06-28 PROCEDURE — 97116 GAIT TRAINING THERAPY: CPT

## 2017-06-28 PROCEDURE — 74011250637 HC RX REV CODE- 250/637: Performed by: ORTHOPAEDIC SURGERY

## 2017-06-28 PROCEDURE — 85018 HEMOGLOBIN: CPT | Performed by: ORTHOPAEDIC SURGERY

## 2017-06-28 PROCEDURE — 97110 THERAPEUTIC EXERCISES: CPT

## 2017-06-28 PROCEDURE — 74011250636 HC RX REV CODE- 250/636: Performed by: ORTHOPAEDIC SURGERY

## 2017-06-28 PROCEDURE — 97150 GROUP THERAPEUTIC PROCEDURES: CPT

## 2017-06-28 PROCEDURE — 97535 SELF CARE MNGMENT TRAINING: CPT

## 2017-06-28 RX ORDER — HYDROMORPHONE HYDROCHLORIDE 2 MG/1
2 TABLET ORAL
Status: DISCONTINUED | OUTPATIENT
Start: 2017-06-28 | End: 2017-06-30 | Stop reason: HOSPADM

## 2017-06-28 RX ORDER — OXYCODONE HYDROCHLORIDE 10 MG/1
10 TABLET ORAL
Qty: 60 TAB | Refills: 0 | Status: SHIPPED | OUTPATIENT
Start: 2017-06-28

## 2017-06-28 RX ORDER — ASPIRIN 325 MG
325 TABLET, DELAYED RELEASE (ENTERIC COATED) ORAL EVERY 12 HOURS
Qty: 120 TAB | Refills: 0 | Status: SHIPPED
Start: 2017-06-28

## 2017-06-28 RX ORDER — PROMETHAZINE HYDROCHLORIDE 25 MG/1
25 TABLET ORAL
Qty: 50 TAB | Refills: 0 | Status: SHIPPED | OUTPATIENT
Start: 2017-06-28

## 2017-06-28 RX ORDER — ONDANSETRON 8 MG/1
8 TABLET, ORALLY DISINTEGRATING ORAL
Status: DISCONTINUED | OUTPATIENT
Start: 2017-06-28 | End: 2017-06-30 | Stop reason: HOSPADM

## 2017-06-28 RX ORDER — HYDROMORPHONE HYDROCHLORIDE 1 MG/ML
1 INJECTION, SOLUTION INTRAMUSCULAR; INTRAVENOUS; SUBCUTANEOUS
Status: DISCONTINUED | OUTPATIENT
Start: 2017-06-28 | End: 2017-06-30 | Stop reason: HOSPADM

## 2017-06-28 RX ADMIN — SENNOSIDES AND DOCUSATE SODIUM 2 TABLET: 8.6; 5 TABLET ORAL at 09:10

## 2017-06-28 RX ADMIN — ACETAMINOPHEN 1000 MG: 500 TABLET, FILM COATED ORAL at 00:33

## 2017-06-28 RX ADMIN — ASPIRIN 325 MG: 325 TABLET, DELAYED RELEASE ORAL at 20:37

## 2017-06-28 RX ADMIN — Medication 5 ML: at 20:38

## 2017-06-28 RX ADMIN — HYDROMORPHONE HYDROCHLORIDE 1 MG: 1 INJECTION, SOLUTION INTRAMUSCULAR; INTRAVENOUS; SUBCUTANEOUS at 18:41

## 2017-06-28 RX ADMIN — CEFAZOLIN 2 G: 1 INJECTION, POWDER, FOR SOLUTION INTRAMUSCULAR; INTRAVENOUS; PARENTERAL at 00:33

## 2017-06-28 RX ADMIN — CELECOXIB 200 MG: 200 CAPSULE ORAL at 09:10

## 2017-06-28 RX ADMIN — ONDANSETRON 8 MG: 8 TABLET, ORALLY DISINTEGRATING ORAL at 06:29

## 2017-06-28 RX ADMIN — Medication 10 ML: at 14:00

## 2017-06-28 RX ADMIN — CELECOXIB 200 MG: 200 CAPSULE ORAL at 20:37

## 2017-06-28 RX ADMIN — ASPIRIN 325 MG: 325 TABLET, DELAYED RELEASE ORAL at 09:10

## 2017-06-28 RX ADMIN — HYDROMORPHONE HYDROCHLORIDE 2 MG: 2 TABLET ORAL at 06:29

## 2017-06-28 RX ADMIN — HYDROMORPHONE HYDROCHLORIDE 2 MG: 2 TABLET ORAL at 10:05

## 2017-06-28 RX ADMIN — HYDROMORPHONE HYDROCHLORIDE 2 MG: 2 TABLET ORAL at 15:41

## 2017-06-28 RX ADMIN — HYDROMORPHONE HYDROCHLORIDE 1 MG: 1 INJECTION, SOLUTION INTRAMUSCULAR; INTRAVENOUS; SUBCUTANEOUS at 12:02

## 2017-06-28 RX ADMIN — SODIUM CHLORIDE 100 ML/HR: 900 INJECTION, SOLUTION INTRAVENOUS at 00:35

## 2017-06-28 NOTE — PROGRESS NOTES
Problem: Self Care Deficits Care Plan (Adult)  Goal: *Acute Goals and Plan of Care (Insert Text)  GOALS:   DISCHARGE GOALS (in preparation for going home/rehab): 3 days  1. Ms. Salvador Jackson will perform one lower body dressing activity with minimal assistance required to demonstrate improved functional mobility and safety. GOAL MET 6/28/2017     2. Ms. Salvador Jackson will perform one lower body bathing activity with minimal assistance required to demonstrate improved functional mobility and safety. GOAL MET 6/28/2017     3. Ms. Salvador Jackson will perform toileting/toilet transfer with contact guard assistance to demonstrate improved functional mobility and safety. GOAL MET 6/28/2017     4. Ms. Salvador Jackson will perform shower transfer with contact guard assistance to demonstrate improved functional mobility and safety. GOAL MET 6/28/2017         JOINT CAMP OCCUPATIONAL THERAPY TKA: Daily Note and AM 6/28/2017  INPATIENT: Hospital Day: 2  Payor: Nick Loya / Plan: [facility] / Product Type: PPO /      NAME/AGE/GENDER: Homer Diane is a 57 y.o. female             PRIMARY DIAGNOSIS:  Unilateral primary osteoarthritis, right knee [M17.11]              Procedure(s) and Anesthesia Type:     * KNEE ARTHROPLASTY TOTAL/ RIGHT/ SAIRA - Spinal (Right)  ICD-10: Treatment Diagnosis:        · Pain in Right Knee (M25.561)  · Stiffness of Right Knee, Not elsewhere classified (M25.661)  · Other lack of cordination (R27.8)       ASSESSMENT:    Ms. Salvador Jackson is s/p right TKA and presents with decreased weight bearing on right LE and decreased independence with functional mobility and activities of daily living. Patient completed shower and dressing as charter below in ADL grid and is ambulating with rolling walker and stand-by assist.  Patient has met 4/4 goals and plans to return home with good family support. Family able to provide patient with appropriate level of assistance at this time.   OT reviewed safety precautions throughout session and therapy schedule for the remainder of today. Patient instructed to call for assistance when needing to get up from recliner and all needs in reach. Patient verbalized understanding of call light. This section established at most recent assessment   PROBLEM LIST (Impairments causing functional limitations):  1. Decreased Strength  2. Decreased ADL/Functional Activities  3. Decreased Transfer Abilities  4. Increased Pain  5. Increased Fatigue  6. Decreased Flexibility/Joint Mobility  7. Decreased Knowledge of Precautions    INTERVENTIONS PLANNED: (Benefits and precautions of occupational therapy have been discussed with the patient.)  1. Activities of daily living training  2. Adaptive equipment training  3. Balance training  4. Clothing management  5. Donning&doffing training  6. Theraputic activity      TREATMENT PLAN: Frequency/Duration: Follow patient 1 time to address above goals. Rehabilitation Potential For Stated Goals: EXCELLENT      RECOMMENDED REHABILITATION/EQUIPMENT: (at time of discharge pending progress): Continue Skilled Therapy and Home Health: Physical Therapy. OCCUPATIONAL PROFILE AND HISTORY:   History of Present Injury/Illness (Reason for Referral): Pt presents this date s/p (right) TKA. Past Medical History/Comorbidities:   Ms. Fredy Wharton  has a past medical history of Migraine and Osteopenia. She also has no past medical history of Adverse effect of anesthesia; Difficult intubation; Malignant hyperthermia due to anesthesia; Nausea & vomiting; or Pseudocholinesterase deficiency. Ms. Fredy Wharton  has a past surgical history that includes orthopaedic (1996) and hysterectomy.   Social History/Living Environment:   Home Environment: Private residence  # Steps to Enter: 2  One/Two Story Residence: One story  Living Alone: No  Support Systems: Spouse/Significant Other/Partner, Child(katharine)  Patient Expects to be Discharged to[de-identified] Private residence  Current DME Used/Available at Home: [address] [place] [address] Rd, [employer], bedside, Shower chair  Tub or Shower Type: Shower  Prior Level of Function/Work/Activity:  Indep/modified indep with BADLs and IADLs      Number of Personal Factors/Comorbidities that affect the Plan of Care: Brief history (0):  LOW COMPLEXITY   ASSESSMENT OF OCCUPATIONAL PERFORMANCE[de-identified]   Most Recent Physical Functioning:   Balance  Sitting: Intact  Standing: Intact; With support        Patient Vitals for the past 6 hrs:   BP BP Patient Position SpO2 Pulse   06/28/17 0748 93/46 At rest 97 % 78   06/28/17 1152 93/46 Sitting 100 % 84                                   Mental Status  Neurologic State: Alert  Orientation Level: Oriented X4  Cognition: Appropriate decision making; Appropriate for age attention/concentration; Appropriate safety awareness; Follows commands  Perception: Appears intact  Perseveration: No perseveration noted  Safety/Judgement: Awareness of environment; Fall prevention                    Basic ADLs (From Assessment) Complex ADLs (From Assessment)   Basic ADL  Feeding: Supervision  Oral Facial Hygiene/Grooming: Minimum assistance  Bathing: Moderate assistance  Upper Body Dressing: Minimum assistance  Lower Body Dressing: Moderate assistance  Toileting: Total assistance     Grooming/Bathing/Dressing Activities of Daily Living   Grooming  Grooming Assistance: Supervision/set up  Washing Face: Supervision/set-up  Washing Hands: Supervision/set-up  Brushing Teeth: Supervision/set-up Cognitive Retraining  Safety/Judgement: Awareness of environment; Fall prevention   Upper Body Bathing  Bathing Assistance: Supervision/set-up  Position Performed: Seated in chair     Lower Body Bathing  Bathing Assistance: Supervision/set-up  Perineal  : Supervision/set-up  Position Performed: Seated in chair  Lower Body : Supervision/set-up  Position Performed: Seated in chair;Standing  Cues: Verbal cues provided  Adaptive Equipment: Grab bar     Upper Body Dressing Assistance  Dressing Assistance: Supervision/set-up  Bra: Supervision/set-up  Pullover Shirt: Supervision/set-up Functional Transfers  Toilet Transfer : Stand-by asssistance  Shower Transfer: Stand-by asssistance   Lower Body Dressing Assistance  Dressing Assistance: Minimum assistance  Underpants: Minimum assistance  Pants With Elastic Waist: Minimum assistance  Socks: Minimum assistance  Cues: Verbal cues provided  Adaptive Equipment Used: Walker Bed/Mat Mobility  Supine to Sit: Stand-by asssistance;Contact guard assistance  Sit to Supine:  (up in chair on contact)  Sit to Stand: Contact guard assistance           Physical Skills Involved:  1. Range of Motion  2. Balance  3. Strength Cognitive Skills Affected (resulting in the inability to perform in a timely and safe manner): 1. none Psychosocial Skills Affected:  1. none   Number of elements that affect the Plan of Care: 1-3:  LOW COMPLEXITY   CLINICAL DECISION MAKIN [facility] Inpatient Short Form  How much help from another person does the patient currently need. .. Total A Lot A Little None   1. Putting on and taking off regular lower body clothing?   [ ] 1   [X] 2   [ ] 3   [ ] 4   2. Bathing (including washing, rinsing, drying)? [ ] 1   [X] 2   [ ] 3   [ ] 4   3. Toileting, which includes using toilet, bedpan or urinal?   [ ] 1   [X] 2   [ ] 3   [ ] 4   4. Putting on and taking off regular upper body clothing?   [ ] 1   [ ] 2   [X] 3   [ ] 4   5. Taking care of personal grooming such as brushing teeth? [ ] 1   [ ] 2   [X] 3   [ ] 4   6. Eating meals? [ ] 1   [ ] 2   [X] 3   [ ] 4   © , Trustees of [address] [place] [address] [address] [address], under license to [employer] All rights reserved   Score:  Initial: 15 Most Recent: X (Date: -- )     Interpretation of Tool:  Represents activities that are increasingly more difficult (i.e. Bed mobility, Transfers, Gait).        Score 24 23 22-20 19-15 14-10 9-7 6       Modifier CH CI CJ CK CL CM CN · Self Care:               - CURRENT STATUS:    CK - 40%-59% impaired, limited or restricted               - GOAL STATUS:           CJ - 20%-39% impaired, limited or restricted               - D/C STATUS:                       ---------------To be determined---------------  Payor: BLUE CROSS / [place] [facility] / Product Type: PPO /       Medical Necessity:     · Patient is expected to demonstrate progress in balance, coordination and functional technique to decrease assistance required with self care and functional mobility. Reason for Services/Other Comments:  · Patient continues to require skilled intervention due to decreased self care and functional mobility. Use of outcome tool(s) and clinical judgement create a POC that gives a: LOW COMPLEXITY                 TREATMENT:   (In addition to Assessment/Re-Assessment sessions the following treatments were rendered)      Pre-treatment Symptoms/Complaints:  3/10 pain  Pain: Initial:   Pain Intensity 1: 3  Pain Location 1: Knee  Pain Orientation 1: Right  Pain Intervention(s) 1: Shower  Post Session:  1/10 iceman in place      Self Care: (40): Procedure(s) (per grid) utilized to improve and/or restore self-care/home management as related to dressing, bathing and toileting. Required minimal verbal and   cueing to facilitate activities of daily living skills. Treatment/Session Assessment:         Response to Treatment: Tolerated well.      Education:  [ ] Home Exercises  [X] Fall Precautions  [ ] Hip Precautions [ ] Going Home Video  [X] Knee/Hip Prosthesis Review  [X] Walker Management/Safety [X] Adaptive Equipment as Needed         Interdisciplinary Collaboration:   · Physical Therapist  · Occupational Therapist  · Registered Nurse     After treatment position/precautions:   · Up in chair, Bed/Chair-wheels locked, Caregiver at bedside, Call light within reach and RN notified     Compliance with Program/Exercises: compliant most of the time. Recommendations/Intent for next treatment session:  Treatment next visit will focus on increasing Ms. Mota's independence with bed mobility, self care and functional mobility modalities for pain, and patient education.        Total Treatment Duration: 40 mins  OT Patient Time In/Time Out  Time In: 1000  Time Out: 06679 Anupama Whitman

## 2017-06-28 NOTE — PROGRESS NOTES
Patient is A&Ox4. Able to verbalize needs. Resting quietly with no distress noted. Shift assessment completed. Dressing to surgical site is dry and intact. Neurovascular and peripheral vascular checks WNL. Sanchez draining clear yellow urine to bag. PIV infusing without difficulty. PIV dressing intact with no s/s of infection. Iceman applied to knee. Denies needs. Bed low and locked. Call light within reach. Instructed to call for assistance. Patient verbalizes understanding. Will continue to monitor.

## 2017-06-28 NOTE — PROGRESS NOTES
2017         Post Op day: 1 Day Post-Op     Admit Date: 2017  Admit Diagnosis: Unilateral primary osteoarthritis, right knee [M17.11]        Subjective: Doing well, No complaints, No SOB, No Chest Pain, No Nausea or Vomiting     Objective:   Vital Signs are Stable, No Acute Distress, Alert and Oriented, Dressing is Dry,  Neurovascular exam is normal.     Assessment / Plan :  Patient Active Problem List   Diagnosis Code    Arthritis of knee, right M17.11    S/P total knee arthroplasty Z96.659    Patient Vitals for the past 8 hrs:   BP Temp Pulse Resp SpO2   17 0748 93/46 98.2 °F (36.8 °C) 78 16 97 %   17 0530 (!) 87/56 96.6 °F (35.9 °C) 75 16 97 %   17 0028 (!) 86/51 96.3 °F (35.7 °C) 64 16 98 %    Temp (24hrs), Av °F (36.1 °C), Min:95.8 °F (35.4 °C), Max:98.6 °F (37 °C)    Body mass index is 22.04 kg/(m^2).     Continue PT  Lab Results   Component Value Date/Time    HGB 8.1 2017 06:10 AM    Monitor HGB   Pt seen by and discussed with Supervising Physician   Home today or tomorrow     Signed By: SONDRA Baker

## 2017-06-28 NOTE — PROGRESS NOTES
Problem: Mobility Impaired (Adult and Pediatric)  Goal: *Acute Goals and Plan of Care (Insert Text)  GOALS (1-4 days):  (1.)Ms. Dnaial Hay will move from supine to sit and sit to supine in bed with STAND BY ASSIST.  (2.)Ms. Danial Hay will transfer from bed to chair and chair to bed with STAND BY ASSIST using the least restrictive device. (3.)Ms. Danial Hay will ambulate with STAND BY ASSIST for 200 feet with the least restrictive device. (4.)Ms. Danial Hay will ambulate up/down 3 steps with bilateral railing with CONTACT GUARD ASSIST with no device. (5.)Ms. Danial Hay will increase right knee ROM to 5°-80°.  ________________________________________________________________________________________________       PHYSICAL THERAPY JOINT CAMP TKA: Daily Note, Treatment Day: 1st and PM 6/28/2017  INPATIENT: Hospital Day: 2  Payor: Ollie Stevenson / Plan: [id number] / Product Type: PPO /      NAME/AGE/GENDER: Isaak Coley is a 57 y.o. female             PRIMARY DIAGNOSIS:  Unilateral primary osteoarthritis, right knee [M17.11]              Procedure(s) and Anesthesia Type:     * KNEE ARTHROPLASTY TOTAL/ RIGHT/ SAIRA - Spinal (Right)  ICD-10: Treatment Diagnosis:        · Pain in Right Knee (M25.561)  · Stiffness of Right Knee, Not elsewhere classified (M25.661)  · Difficulty in walking, Not elsewhere classified (R26.2)  · Other abnormalities of gait and mobility (R26.89)       ASSESSMENT:      Ms. Danial Hay sitting up in chair on contact and agreeable for afternoon therapy. Worked on gait training in the baumann, progressing nicely with assistance level. In gym making nice progress with TKA exercises. Back in room, ice in place and needs in reach. Hope to further progress tomorrow. This section established at most recent assessment   PROBLEM LIST (Impairments causing functional limitations):  1. Decreased Strength  2. Decreased ADL/Functional Activities  3. Decreased Transfer Abilities  4.  Decreased Ambulation Ability/Technique  5. Decreased Flexibility/Joint Mobility  6. Decreased Lena with Home Exercise Program    INTERVENTIONS PLANNED: (Benefits and precautions of physical therapy have been discussed with the patient.)  1. Bed Mobility  2. Gait Training  3. Home Exercise Program (HEP)  4. Therapeutic Exercise/Strengthening  5. Transfer Training  6. Range of Motion: active/assisted/passive  7. Therapeutic Activities  8. Group Therapy      TREATMENT PLAN: Frequency/Duration: Follow patient BID   to address above goals. Rehabilitation Potential For Stated Goals: GOOD      RECOMMENDED REHABILITATION/EQUIPMENT: (at time of discharge pending progress): Continue Skilled Therapy and Home Health: Physical Therapy. HISTORY:   History of Present Injury/Illness (Reason for Referral):  S/P R TKA  Past Medical History/Comorbidities:   Ms. Ismael Encarnacion  has a past medical history of Migraine and Osteopenia. She also has no past medical history of Adverse effect of anesthesia; Difficult intubation; Malignant hyperthermia due to anesthesia; Nausea & vomiting; or Pseudocholinesterase deficiency. Ms. Ismael Encarnacion  has a past surgical history that includes orthopaedic (1996) and hysterectomy. Social History/Living Environment:   Home Environment: Private residence  # Steps to Enter: 2  One/Two Story Residence: One story  Living Alone: No  Support Systems: Spouse/Significant Other/Partner, Child(katharine)  Patient Expects to be Discharged to[de-identified] Private residence  Current DME Used/Available at Home: [address], bedside, [address] [facility] chair  Tub or Shower Type: Shower  Prior Level of Function/Work/Activity:  Independent with gait and ADLs.    Number of Personal Factors/Comorbidities that affect the Plan of Care: 0: LOW COMPLEXITY   EXAMINATION:   Most Recent Physical Functioning:                RLE AROM  R Knee Flexion: 80  R Knee Extension: 2              Bed Mobility  Supine to Sit: Stand-by asssistance;Contact guard assistance  Sit to Supine:  (up in chair on contact)     Transfers  Sit to Stand: Contact guard assistance  Stand to Sit: Contact guard assistance     Balance  Sitting: Intact  Standing: Intact; With support          Gait Training: Yes     Weight Bearing Status  Right Side Weight Bearing: As tolerated  Distance (ft): 80 Feet (ft) (and another 80 feet)  Ambulation - Level of Assistance: Stand-by asssistance;Contact guard assistance  Assistive Device: Walker, rolling  Speed/Susie: Pace decreased (<100 feet/min)  Step Length: Left shortened  Stance: Right decreased  Gait Abnormalities: Antalgic;Decreased step clearance  Interventions: Safety awareness training;Verbal cues      Braces/Orthotics: none     Right Knee Cold  Type: Cryocuff       Body Structures Involved:  1. Bones  2. Joints  3. Muscles Body Functions Affected:  1. Neuromusculoskeletal  2. Movement Related Activities and Participation Affected:  1. General Tasks and Demands  2. Mobility  3. Self Care   Number of elements that affect the Plan of Care: 3: MODERATE COMPLEXITY   CLINICAL PRESENTATION:   Presentation: Stable and uncomplicated: LOW COMPLEXITY   CLINICAL DECISION MAKIN [facility] [address] [id number] AM-PAC 6 Clicks   Basic Mobility Inpatient Short Form  How much difficulty does the patient currently have. .. Unable A Lot A Little None   1. Turning over in bed (including adjusting bedclothes, sheets and blankets)? [ ] 1   [ ] 2   [ ] 3   [X] 4   2. Sitting down on and standing up from a chair with arms ( e.g., wheelchair, bedside commode, etc.)   [ ] 1   [ ] 2   [X] 3   [ ] 4   3. Moving from lying on back to sitting on the side of the bed? [ ] 1   [ ] 2   [X] 3   [ ] 4   How much help from another person does the patient currently need. .. Total A Lot A Little None   4. Moving to and from a bed to a chair (including a wheelchair)? [ ] 1   [ ] 2   [X] 3   [ ] 4   5. Need to walk in hospital room? [ ] 1   [ ] 2   [X] 3   [ ] 4   6.   Climbing 3-5 steps with a railing? [ ] 1   [X] 2   [ ] 3   [ ] 4   © 2007, Trustees of [address] [place] [address] [address] [address], under license to [employer]. All rights reserved       Score:  Initial: 18 Most Recent: X (Date: -- )     Interpretation of Tool:  Represents activities that are increasingly more difficult (i.e. Bed mobility, Transfers, Gait). Score 24 23 22-20 19-15 14-10 9-7 6       Modifier CH CI CJ CK CL CM CN         · Mobility - Walking and Moving Around:               - CURRENT STATUS:    CK - 40%-59% impaired, limited or restricted               - GOAL STATUS:           CJ - 20%-39% impaired, limited or restricted               - D/C STATUS:                       ---------------To be determined---------------  Payor: BLUE CROSS / [place] BL[place] [facility] / Product Type: PPO /       Medical Necessity:     · Patient demonstrates good rehab potential due to higher previous functional level. Reason for Services/Other Comments:  · Patient continues to require present interventions due to patient's inability to perform functional mobility and exercises independently. Use of outcome tool(s) and clinical judgement create a POC that gives a: Clear prediction of patient's progress: LOW COMPLEXITY                 TREATMENT:         Pre-treatment Symptoms/Complaints:  none  Pain: Initial: no complaints  Pain Intensity 1:  (not rating, but pain with therapy)  Post Session:  No complaints      Gait Training (15 Minutes):  Gait training to improve and/or restore physical functioning as related to mobility and strength. Ambulated 80 Feet (ft) (and another 80 feet) with Stand-by asssistance;Contact guard assistance using a Walker, rolling and minimal Safety awareness training;Verbal cues related to their stance phase and stride length to promote proper body alignment and promote proper body posture. Instruction in performance of walker use and gait sequencing to correct stance phase and stride length.   Therapeutic Exercise: (45 Minutes (group)):  Exercises per grid below to improve mobility and strength. Required minimal verbal cues to promote proper body alignment and promote proper body posture. Progressed repetitions as indicated. Date:   6/28/17 Date:    Date:      ACTIVITY/EXERCISE AM PM AM PM AM PM   GROUP THERAPY  [ ]  [place].Melena ]  [ ]  [ ]  [ ]  [ ]   Ankle Pumps 10  15            Quad Sets  10  15           Gluteal Sets  10  15           Hip ABd/ADduction  10  15           Straight Leg Raises  10  15           Knee Slides  10  15           Short Arc Quads    15           Long Arc Quads               Chair Slides    15                           B = bilateral; AA = active assistive; A = active; P = passive       Treatment/Session Assessment:         Response to Treatment:  Nice progress with mobility, some light headedness     Education:  [ ] Home Exercises  [X] Fall Precautions   [ ] Going Home Video  [ ] Knee Prosthesis Review  [X] Walker Management/Safety [ ] Adaptive Equipment as Needed         Interdisciplinary Collaboration:   · Registered Nurse and Rehabilitation Attendant     After treatment position/precautions:   · Up in chair, Bed/Chair-wheels locked and Call light within reach     Compliance with Program/Exercises: compliant all the time. Recommendations/Intent for next treatment session:  Treatment next visit will focus on increasing Ms. Mota's independence with bed mobility, transfers, gait training, strength/ROM exercises, modalities for pain, and patient education.        Total Treatment Duration:  PT Patient Time In/Time Out  Time In: 1300  Time Out: 14[address] Tanner Vanegas, PT

## 2017-06-28 NOTE — PROGRESS NOTES
Problem: Mobility Impaired (Adult and Pediatric)  Goal: *Acute Goals and Plan of Care (Insert Text)  GOALS (1-4 days):  (1.)Ms. Eduardo Oconnor will move from supine to sit and sit to supine in bed with STAND BY ASSIST.  (2.)Ms. Eduardo Oconnor will transfer from bed to chair and chair to bed with STAND BY ASSIST using the least restrictive device. (3.)Ms. Eduardo Oconnor will ambulate with STAND BY ASSIST for 200 feet with the least restrictive device. (4.)Ms. Eduardo Oconnor will ambulate up/down 3 steps with bilateral railing with CONTACT GUARD ASSIST with no device. (5.)Ms. Eduardo Oconnor will increase right knee ROM to 5°-80°.  ________________________________________________________________________________________________       PHYSICAL THERAPY JOINT CAMP TKA: Daily Note, Treatment Day: 1st and AM 6/28/2017  INPATIENT: Hospital Day: 2  Payor: Sarina Harris / Plan: [facility] / Product Type: PPO /      NAME/AGE/GENDER: Colton Anne is a 57 y.o. female             PRIMARY DIAGNOSIS:  Unilateral primary osteoarthritis, right knee [M17.11]              Procedure(s) and Anesthesia Type:     * KNEE ARTHROPLASTY TOTAL/ RIGHT/ SAIRA - Spinal (Right)  ICD-10: Treatment Diagnosis:        · Pain in Right Knee (M25.561)  · Stiffness of Right Knee, Not elsewhere classified (M25.661)  · Difficulty in walking, Not elsewhere classified (R26.2)  · Other abnormalities of gait and mobility (R26.89)       ASSESSMENT:      Ms. Eduardo Oconnor supine in bed on contact and ready to get up and move. Worked on bed mobility and transfers with verbal cues. In baumann worked on gait training with walker, verbal cues for walker use and gait sequencing. In room worked on TKA exercises with cues. Pt left up in chair with ice in place and needs in reach. Hope to progress this afternoon. This section established at most recent assessment   PROBLEM LIST (Impairments causing functional limitations):  1. Decreased Strength  2. Decreased ADL/Functional Activities  3.  Decreased Transfer Abilities  4. Decreased Ambulation Ability/Technique  5. Decreased Flexibility/Joint Mobility  6. Decreased [place] with Home Exercise Program    INTERVENTIONS PLANNED: (Benefits and precautions of physical therapy have been discussed with the patient.)  1. Bed Mobility  2. Gait Training  3. Home Exercise Program (HEP)  4. Therapeutic Exercise/Strengthening  5. Transfer Training  6. Range of Motion: active/assisted/passive  7. Therapeutic Activities  8. Group Therapy      TREATMENT PLAN: Frequency/Duration: Follow patient BID   to address above goals. Rehabilitation Potential For Stated Goals: GOOD      RECOMMENDED REHABILITATION/EQUIPMENT: (at time of discharge pending progress): Continue Skilled Therapy and Home Health: Physical Therapy. HISTORY:   History of Present Injury/Illness (Reason for Referral):  S/P R TKA  Past Medical History/Comorbidities:   Ms. oYnny Orozco  has a past medical history of Migraine and Osteopenia. She also has no past medical history of Adverse effect of anesthesia; Difficult intubation; Malignant hyperthermia due to anesthesia; Nausea & vomiting; or Pseudocholinesterase deficiency. Ms. Yonny Orozco  has a past surgical history that includes orthopaedic (1996) and hysterectomy. Social History/Living Environment:   Home Environment: Private residence  # Steps to Enter: 2  One/Two Story Residence: One story  Living Alone: No  Support Systems: Spouse/Significant Other/Partner, Child(katharine)  Patient Expects to be Discharged to[de-identified] Private residence  Current DME Used/Available at Home: [address], bedside, [address] [facility] chair  Tub or Shower Type: Shower  Prior Level of Function/Work/Activity:  Independent with gait and ADLs.    Number of Personal Factors/Comorbidities that affect the Plan of Care: 0: LOW COMPLEXITY   EXAMINATION:   Most Recent Physical Functioning:                               Bed Mobility  Supine to Sit: Stand-by asssistance;Contact guard assistance  Sit to Supine:  (up in chair on contact)     Transfers  Sit to Stand: Contact guard assistance  Stand to Sit: Contact guard assistance     Balance  Sitting: Intact  Standing: Intact; With support          Gait Training: Yes     Weight Bearing Status  Right Side Weight Bearing: As tolerated  Distance (ft): 125 Feet (ft)  Ambulation - Level of Assistance: Contact guard assistance  Assistive Device: Walker, rolling  Speed/Susie: Pace decreased (<100 feet/min)  Step Length: Left shortened  Stance: Right decreased  Gait Abnormalities: Antalgic;Decreased step clearance  Interventions: Safety awareness training;Verbal cues      Braces/Orthotics: none     Right Knee Cold  Type: Cryocuff       Body Structures Involved:  1. Bones  2. Joints  3. Muscles Body Functions Affected:  1. Neuromusculoskeletal  2. Movement Related Activities and Participation Affected:  1. General Tasks and Demands  2. Mobility  3. Self Care   Number of elements that affect the Plan of Care: 3: MODERATE COMPLEXITY   CLINICAL PRESENTATION:   Presentation: Stable and uncomplicated: LOW COMPLEXITY   CLINICAL DECISION MAKIN hospitals [address] [id number] AM-PAC 6 Clicks   Basic Mobility Inpatient Short Form  How much difficulty does the patient currently have. .. Unable A Lot A Little None   1. Turning over in bed (including adjusting bedclothes, sheets and blankets)? [ ] 1   [ ] 2   [ ] 3   [X] 4   2. Sitting down on and standing up from a chair with arms ( e.g., wheelchair, bedside commode, etc.)   [ ] 1   [ ] 2   [X] 3   [ ] 4   3. Moving from lying on back to sitting on the side of the bed? [ ] 1   [ ] 2   [X] 3   [ ] 4   How much help from another person does the patient currently need. .. Total A Lot A Little None   4. Moving to and from a bed to a chair (including a wheelchair)? [ ] 1   [ ] 2   [X] 3   [ ] 4   5. Need to walk in hospital room? [ ] 1   [ ] 2   [X] 3   [ ] 4   6. Climbing 3-5 steps with a railing?    [ ] 1   [X] 2   [ ] 3   [ ] 4   © 2007, Trustees of [address] [place] [address] [address] [address], under license to [employer]. All rights reserved       Score:  Initial: 18 Most Recent: X (Date: -- )     Interpretation of Tool:  Represents activities that are increasingly more difficult (i.e. Bed mobility, Transfers, Gait). Score 24 23 22-20 19-15 14-10 9-7 6       Modifier CH CI CJ CK CL CM CN         · Mobility - Walking and Moving Around:               - CURRENT STATUS:    CK - 40%-59% impaired, limited or restricted               - GOAL STATUS:           CJ - 20%-39% impaired, limited or restricted               - D/C STATUS:                       ---------------To be determined---------------  Payor: BLUE CROSS / [place] [facility] / Product Type: PPO /       Medical Necessity:     · Patient demonstrates good rehab potential due to higher previous functional level. Reason for Services/Other Comments:  · Patient continues to require present interventions due to patient's inability to perform functional mobility and exercises independently. Use of outcome tool(s) and clinical judgement create a POC that gives a: Clear prediction of patient's progress: LOW COMPLEXITY                 TREATMENT:         Pre-treatment Symptoms/Complaints:  Some lightheadedness after mobility  Pain: Initial: no complaints     Post Session:  No complaints      Gait Training (15 Minutes):  Gait training to improve and/or restore physical functioning as related to mobility and strength. Ambulated 125 Feet (ft) with Contact guard assistance using a Walker, rolling and minimal Safety awareness training;Verbal cues related to their stance phase and stride length to promote proper body alignment and promote proper body posture. Instruction in performance of walker use and gait sequencing to correct stance phase and stride length. Therapeutic Exercise: (15 Minutes):  Exercises per grid below to improve mobility and strength.   Required minimal verbal cues to promote proper body alignment and promote proper body posture. Progressed repetitions as indicated. Date:   6/28/17 Date:    Date:      ACTIVITY/EXERCISE AM PM AM PM AM PM   GROUP THERAPY  [ ]  [ ]  [ ]  [ ]  [ ]  [ ]   Ankle Pumps 10              Quad Sets  10             Gluteal Sets  10             Hip ABd/ADduction  10             Straight Leg Raises  10             Knee Slides  10             Short Arc Quads               Long Arc Quads               Chair Slides                               B = bilateral; AA = active assistive; A = active; P = passive       Treatment/Session Assessment:         Response to Treatment:  Nice progress with mobility, some light headedness     Education:  [ ] Home Exercises  [X] Fall Precautions   [ ] Going Home Video  [ ] Knee Prosthesis Review  [X] Walker Management/Safety [ ] Adaptive Equipment as Needed         Interdisciplinary Collaboration:   · Registered Nurse and Rehabilitation Attendant     After treatment position/precautions:   · Up in chair, Bed/Chair-wheels locked and Call light within reach     Compliance with Program/Exercises: compliant all the time. Recommendations/Intent for next treatment session:  Treatment next visit will focus on increasing Ms. Mota's independence with bed mobility, transfers, gait training, strength/ROM exercises, modalities for pain, and patient education.        Total Treatment Duration:  PT Patient Time In/Time Out  Time In: 0815  Time Out: [address], PT

## 2017-06-29 VITALS
HEIGHT: 66 IN | TEMPERATURE: 96.8 F | SYSTOLIC BLOOD PRESSURE: 102 MMHG | OXYGEN SATURATION: 98 % | WEIGHT: 134.48 LBS | HEART RATE: 79 BPM | RESPIRATION RATE: 18 BRPM | BODY MASS INDEX: 21.61 KG/M2 | DIASTOLIC BLOOD PRESSURE: 59 MMHG

## 2017-06-29 LAB — HGB BLD-MCNC: 7.6 G/DL (ref 11.7–15.4)

## 2017-06-29 PROCEDURE — 77030012935 HC DRSG AQUACEL BMS -B

## 2017-06-29 PROCEDURE — 30233N1 TRANSFUSION OF NONAUTOLOGOUS RED BLOOD CELLS INTO PERIPHERAL VEIN, PERCUTANEOUS APPROACH: ICD-10-PCS | Performed by: ORTHOPAEDIC SURGERY

## 2017-06-29 PROCEDURE — 74011250637 HC RX REV CODE- 250/637: Performed by: ORTHOPAEDIC SURGERY

## 2017-06-29 PROCEDURE — 36430 TRANSFUSION BLD/BLD COMPNT: CPT

## 2017-06-29 PROCEDURE — 85018 HEMOGLOBIN: CPT | Performed by: ORTHOPAEDIC SURGERY

## 2017-06-29 PROCEDURE — 77030013131 HC IV BLD ST ICUM -A

## 2017-06-29 PROCEDURE — 36415 COLL VENOUS BLD VENIPUNCTURE: CPT | Performed by: ORTHOPAEDIC SURGERY

## 2017-06-29 PROCEDURE — 97110 THERAPEUTIC EXERCISES: CPT

## 2017-06-29 PROCEDURE — 97116 GAIT TRAINING THERAPY: CPT

## 2017-06-29 PROCEDURE — 97150 GROUP THERAPEUTIC PROCEDURES: CPT

## 2017-06-29 PROCEDURE — P9016 RBC LEUKOCYTES REDUCED: HCPCS | Performed by: PHYSICIAN ASSISTANT

## 2017-06-29 RX ORDER — SODIUM CHLORIDE 9 MG/ML
250 INJECTION, SOLUTION INTRAVENOUS AS NEEDED
Status: DISCONTINUED | OUTPATIENT
Start: 2017-06-29 | End: 2017-06-30 | Stop reason: HOSPADM

## 2017-06-29 RX ADMIN — CELECOXIB 200 MG: 200 CAPSULE ORAL at 08:53

## 2017-06-29 RX ADMIN — Medication 5 ML: at 06:22

## 2017-06-29 RX ADMIN — HYDROMORPHONE HYDROCHLORIDE 2 MG: 2 TABLET ORAL at 16:43

## 2017-06-29 RX ADMIN — HYDROMORPHONE HYDROCHLORIDE 2 MG: 2 TABLET ORAL at 12:38

## 2017-06-29 RX ADMIN — SENNOSIDES AND DOCUSATE SODIUM 2 TABLET: 8.6; 5 TABLET ORAL at 08:54

## 2017-06-29 RX ADMIN — ACETAMINOPHEN 1000 MG: 500 TABLET, FILM COATED ORAL at 16:43

## 2017-06-29 RX ADMIN — HYDROMORPHONE HYDROCHLORIDE 2 MG: 2 TABLET ORAL at 08:54

## 2017-06-29 RX ADMIN — ACETAMINOPHEN 1000 MG: 500 TABLET, FILM COATED ORAL at 06:00

## 2017-06-29 RX ADMIN — ONDANSETRON 8 MG: 8 TABLET, ORALLY DISINTEGRATING ORAL at 00:09

## 2017-06-29 RX ADMIN — ACETAMINOPHEN 1000 MG: 500 TABLET, FILM COATED ORAL at 12:14

## 2017-06-29 RX ADMIN — ASPIRIN 325 MG: 325 TABLET, DELAYED RELEASE ORAL at 08:54

## 2017-06-29 RX ADMIN — HYDROMORPHONE HYDROCHLORIDE 2 MG: 2 TABLET ORAL at 00:03

## 2017-06-29 RX ADMIN — ACETAMINOPHEN 1000 MG: 500 TABLET, FILM COATED ORAL at 00:02

## 2017-06-29 NOTE — PROGRESS NOTES
Patient resting quietly, alert and oriented, no distress noted. Right knee dressing c/d/i, voiding clear yellow urine, ambulates with walker. Neurovascular and peripheral vascular checks WNL. Bed low and locked position. Call light within reach. Patient instructed to call for assistance, verbalizes understanding. Nursing assessment complete.

## 2017-06-29 NOTE — PROGRESS NOTES
Problem: Mobility Impaired (Adult and Pediatric)  Goal: *Acute Goals and Plan of Care (Insert Text)  GOALS (1-4 days):  (1.)Ms. Fredy Wharton will move from supine to sit and sit to supine in bed with STAND BY ASSIST.  (2.)Ms. Fredy Wharton will transfer from bed to chair and chair to bed with STAND BY ASSIST using the least restrictive device. (3.)Ms. Fredy Wharton will ambulate with STAND BY ASSIST for 200 feet with the least restrictive device. (4.)Ms. Fredy Wharton will ambulate up/down 3 steps with bilateral railing with CONTACT GUARD ASSIST with no device. (5.)Ms. Fredy Wharton will increase right knee ROM to 5°-80°.  ________________________________________________________________________________________________       PHYSICAL THERAPY JOINT CAMP TKA: Daily Note, Treatment Day: 2nd and AM 6/29/2017  INPATIENT: Hospital Day: 3  Payor: Tor Lowe / Plan: [facility] / Product Type: PPO /      NAME/AGE/GENDER: Yoanna Ford is a 57 y.o. female             PRIMARY DIAGNOSIS:  Unilateral primary osteoarthritis, right knee [M17.11]              Procedure(s) and Anesthesia Type:     * KNEE ARTHROPLASTY TOTAL/ RIGHT/ SAIRA - Spinal (Right)  ICD-10: Treatment Diagnosis:        · Pain in Right Knee (M25.561)  · Stiffness of Right Knee, Not elsewhere classified (M25.661)  · Difficulty in walking, Not elsewhere classified (R26.2)  · Other abnormalities of gait and mobility (R26.89)       ASSESSMENT:      Ms. Fredy Wharton standing up in doorway on contact waiting for therapist. Carlo Torres on gait training, progressing from yesterday with distance. In gym worked on TKA exercises with verbal cues progressing with repetitions. Practiced stairs. Pt plans to go home later this afternoon, she is going to get a blood transfusion. Will continue to follow. This section established at most recent assessment   PROBLEM LIST (Impairments causing functional limitations):  1. Decreased Strength  2. Decreased ADL/Functional Activities  3.  Decreased Transfer Abilities  4. Decreased Ambulation Ability/Technique  5. Decreased Flexibility/Joint Mobility  6. Decreased [place] with Home Exercise Program    INTERVENTIONS PLANNED: (Benefits and precautions of physical therapy have been discussed with the patient.)  1. Bed Mobility  2. Gait Training  3. Home Exercise Program (HEP)  4. Therapeutic Exercise/Strengthening  5. Transfer Training  6. Range of Motion: active/assisted/passive  7. Therapeutic Activities  8. Group Therapy      TREATMENT PLAN: Frequency/Duration: Follow patient BID   to address above goals. Rehabilitation Potential For Stated Goals: GOOD      RECOMMENDED REHABILITATION/EQUIPMENT: (at time of discharge pending progress): Continue Skilled Therapy and Home Health: Physical Therapy. HISTORY:   History of Present Injury/Illness (Reason for Referral):  S/P R TKA  Past Medical History/Comorbidities:   Ms. Brittany Wilburn  has a past medical history of Migraine and Osteopenia. She also has no past medical history of Adverse effect of anesthesia; Difficult intubation; Malignant hyperthermia due to anesthesia; Nausea & vomiting; or Pseudocholinesterase deficiency. Ms. Brittany Wilburn  has a past surgical history that includes orthopaedic (1996) and hysterectomy. Social History/Living Environment:   Home Environment: Private residence  # Steps to Enter: 2  One/Two Story Residence: One story  Living Alone: No  Support Systems: Spouse/Significant Other/Partner, Child(katharine)  Patient Expects to be Discharged to[de-identified] Private residence  Current DME Used/Available at Home: [address], bedside, [address] [facility] chair  Tub or Shower Type: Shower  Prior Level of Function/Work/Activity:  Independent with gait and ADLs.    Number of Personal Factors/Comorbidities that affect the Plan of Care: 0: LOW COMPLEXITY   EXAMINATION:   Most Recent Physical Functioning:                RLE AROM  R Knee Flexion: 90  R Knee Extension: 4                    Transfers  Sit to Stand: Stand-by asssistance  Stand to Sit: Stand-by asssistance     Balance  Sitting: Intact  Standing: Intact; With support          Gait Training: Yes     Weight Bearing Status  Right Side Weight Bearing: As tolerated  Distance (ft): 142 Feet (ft) (and another 80 feet)  Ambulation - Level of Assistance: Stand-by asssistance  Assistive Device: Walker, rolling  Speed/Susie: Pace decreased (<100 feet/min)  Step Length: Left shortened  Stance: Right decreased  Gait Abnormalities: Antalgic;Decreased step clearance  Number of Stairs Trained: 3  Stairs - Level of Assistance: Contact guard assistance  Rail Use: Both  Interventions: Safety awareness training;Verbal cues      Braces/Orthotics: none     Right Knee Cold  Type: Cryocuff       Body Structures Involved:  1. Bones  2. Joints  3. Muscles Body Functions Affected:  1. Neuromusculoskeletal  2. Movement Related Activities and Participation Affected:  1. General Tasks and Demands  2. Mobility  3. Self Care   Number of elements that affect the Plan of Care: 3: MODERATE COMPLEXITY   CLINICAL PRESENTATION:   Presentation: Stable and uncomplicated: LOW COMPLEXITY   CLINICAL DECISION MAKIN[address] [place], [place] [id number] AM-PAC 6 Clicks   Basic Mobility Inpatient Short Form  How much difficulty does the patient currently have. .. Unable A Lot A Little None   1. Turning over in bed (including adjusting bedclothes, sheets and blankets)? [ ] 1   [ ] 2   [ ] 3   [X] 4   2. Sitting down on and standing up from a chair with arms ( e.g., wheelchair, bedside commode, etc.)   [ ] 1   [ ] 2   [X] 3   [ ] 4   3. Moving from lying on back to sitting on the side of the bed? [ ] 1   [ ] 2   [X] 3   [ ] 4   How much help from another person does the patient currently need. .. Total A Lot A Little None   4. Moving to and from a bed to a chair (including a wheelchair)? [ ] 1   [ ] 2   [X] 3   [ ] 4   5. Need to walk in hospital room? [ ] 1   [ ] 2   [X] 3   [ ] 4   6.   Climbing 3-5 steps with a railing? [ ] 1   [X] 2   [ ] 3   [ ] 4   © 2007, Trustees of [address] [place] [address] [address] [address], under license to [employer]. All rights reserved       Score:  Initial: 18 Most Recent: X (Date: -- )     Interpretation of Tool:  Represents activities that are increasingly more difficult (i.e. Bed mobility, Transfers, Gait). Score 24 23 22-20 19-15 14-10 9-7 6       Modifier CH CI CJ CK CL CM CN         · Mobility - Walking and Moving Around:               - CURRENT STATUS:    CK - 40%-59% impaired, limited or restricted               - GOAL STATUS:           CJ - 20%-39% impaired, limited or restricted               - D/C STATUS:                       ---------------To be determined---------------  Payor: BLUE CROSS / Plan: [place] [facility] [employer] / Product Type: PPO /       Medical Necessity:     · Patient demonstrates good rehab potential due to higher previous functional level. Reason for Services/Other Comments:  · Patient continues to require present interventions due to patient's inability to perform functional mobility and exercises independently. Use of outcome tool(s) and clinical judgement create a POC that gives a: Clear prediction of patient's progress: LOW COMPLEXITY                 TREATMENT:         Pre-treatment Symptoms/Complaints:  none  Pain: Initial: no complaints  Pain Intensity 1:  (not rating, but pain with therapy)  Post Session:  No complaints      Gait Training (15 Minutes):  Gait training to improve and/or restore physical functioning as related to mobility and strength. Ambulated 142 Feet (ft) (and another 80 feet) with Stand-by asssistance using a Walker, rolling and minimal Safety awareness training;Verbal cues related to their stance phase and stride length to promote proper body alignment and promote proper body posture. Instruction in performance of walker use and gait sequencing to correct stance phase and stride length.   Therapeutic Exercise: (45 Minutes (group)):  Exercises per grid below to improve mobility and strength. Required minimal verbal cues to promote proper body alignment and promote proper body posture. Progressed repetitions as indicated. Date:   6/28/17 Date:  6/29/17  Date:      ACTIVITY/EXERCISE AM PM AM PM AM PM   GROUP THERAPY  [ ]  Severiano ]  Severiano ]  [ ]  [ ]  [ ]   Ankle Pumps 10  15   20         Quad Sets  10  15  20         Gluteal Sets  10  15  20         Hip ABd/ADduction  10  15  20         Straight Leg Raises  10  15  20         Knee Slides  10  15  20         Short Arc Quads    15  20         Long Arc Quads               Chair Slides    15  20                         B = bilateral; AA = active assistive; A = active; P = passive       Treatment/Session Assessment:         Response to Treatment:  Tolerated well, not having any light headedness but has low HGB     Education:  [ ] Home Exercises  [X] Fall Precautions   [ ] Going Home Video  [ ] Knee Prosthesis Review  [X] Walker Management/Safety [ ] Adaptive Equipment as Needed         Interdisciplinary Collaboration:   · Registered Nurse and [address]     After treatment position/precautions:   · Up in chair, Bed/Chair-wheels locked, Call light within reach and Family at bedside     Compliance with Program/Exercises: compliant all the time. Recommendations/Intent for next treatment session:  Treatment next visit will focus on increasing Ms. Mota's independence with bed mobility, transfers, gait training, strength/ROM exercises, modalities for pain, and patient education.        Total Treatment Duration:  PT Patient Time In/Time Out  Time In: 1030  Time Out: [address], PT

## 2017-06-29 NOTE — DISCHARGE INSTRUCTIONS
[facility]   Patient Discharge Instructions    Gayathri Figueredo / [id number] : 1955    Admitted 2017 Discharged: 2017     IF YOU HAVE ANY PROBLEMS ONCE YOU ARE AT HOME CALL THE FOLLOWING NUMBERS:   Main office number: [phone]    Take Home Medications     · It is important that you take the medication exactly as they are prescribed. · Keep your medication in the bottles provided by the pharmacist and keep a list of the medication names, dosages, and times to be taken in your wallet. · Do not take other medications without consulting your doctor. What to do at [address] your prehospital diet. If you have excessive nausea or vomitting call your doctor's office     Home Physical Therapy is arranged. Use rolling walker when walking. Use Ambrose Hose stockings until we see you in the office for your follow up appointment with Dr. Tammy Escobar    Patients who have had a joint replacement should not drive until you are seen for your follow up appointment by Dr. Tammy Escobar. When to Call    - Call if you have a temperature greater then 101  - Unable to keep food down  - Loose control of your bladder or bowel function  - Are unable to bear any weight   - Need a pain medication refill       DISCHARGE SUMMARY from Nurse    The following personal items collected during your admission are returned to you:   Dental Appliance: Dental Appliances: None  Vision: Visual Aid: Glasses  Hearing Aid:   na  Jewelry: Jewelry: Ring, Earrings (Rings x2 and earrings )  Clothing: Clothing: At bedside, Footwear, Pants, Shirt, Socks, Undergarments  Other Valuables:  Other Valuables: Cell Phone, Eyeglasses  Valuables sent to safe:   na    PATIENT INSTRUCTIONS:    After general anesthesia or intravenous sedation, for 24 hours or while taking prescription Narcotics:  · Limit your activities  · Do not drive and operate hazardous machinery  · Do not make important personal or business decisions  · Do  not drink alcoholic beverages  · If you have not urinated within 8 hours after discharge, please contact your surgeon on call. Report the following to your surgeon:  · Excessive pain, swelling, redness or odor of or around the surgical area  · Temperature over 101  · Nausea and vomiting lasting longer than 4 hours or if unable to take medications  · Any signs of decreased circulation or nerve impairment to extremity: change in color, persistent  numbness, tingling, coldness or increase pain  · Any questions, call office @ [phone]      Keep scheduled follow up appointment. If need to change, call office @ [phone]. *  Please give a list of your current medications to your Primary Care Provider. *  Please update this list whenever your medications are discontinued, doses are      changed, or new medications (including over-the-counter products) are added. *  Please carry medication information at all times in case of emergency situations. Total Knee Replacement: What to Expect at [address] [place] [address]     When you leave the hospital, you should be able to move around with a walker or crutches. But you will need someone to help you at home for the next few weeks or until you have more energy and can move around better. If there is no one to help you at home, you may go to a rehabilitation center. You will go home with a bandage and stitches or staples. Change the bandage as your doctor tells you to. Your doctor will remove your stitches or staples 10 to 21 days after your surgery. You may still have some mild pain, and the area may be swollen for 3 to 6 months after surgery. Your knee will continue to improve for 6 to 12 months. You will probably use a walker for 1 to 3 weeks and then use crutches. When you are ready, you can use a cane. You will probably be able to walk on your own in 4 to 8 weeks. You will need to do months of physical rehabilitation (rehab) after a knee replacement.  Rehab will help you strengthen the muscles of the knee and help you regain movement. After you recover, your artificial knee will allow you to do normal daily activities with less pain or no pain at all. You may be able to hike, dance, ride a bike, and play golf. Talk to your doctor about whether you can do more strenuous activities. Always tell your caregivers that you have an artificial knee. How long it will take to walk on your own, return to normal activities, and go back to work depends on your health and how well your rehabilitation (rehab) program goes. The better you do with your rehab exercises, the quicker you will get your strength and movement back. This care sheet gives you a general idea about how long it will take for you to recover. But each person recovers at a different pace. Follow the steps below to get better as quickly as possible. How can you care for yourself at home? Activity  · Rest when you feel tired. You may take a nap, but do not stay in bed all day. When you sit, use a chair with arms. You can use the arms to help you stand up. · Work with your physical therapist to find the best way to exercise. You may be able to take frequent, short walks using crutches or a walker. What you can do as your knee heals will depend on whether your new knee is cemented or uncemented. You may not be able to do certain things for a while if your new knee is uncemented. · After your knee has healed enough, you can do more strenuous activities with caution. ¨ You can golf, but use a golf cart, and do not wear shoes with spikes. ¨ You can bike on a flat road or on a stationary bike. Avoid biking up hills. ¨ Your doctor may suggest that you stay away from activities that put stress on your knee. These include tennis or badminton, squash or racquetball, contact sports like football, jumping (such as in basketball), jogging, or running. ¨ Avoid activities where you might fall.  These include horseback riding, skiing, and mountain biking. · Do not sit for more than 1 hour at a time. Get up and walk around for a while before you sit again. If you must sit for a long time, prop up your leg with a chair or footstool. This will help you avoid swelling. · Ask your doctor when you can shower. You may need to take sponge baths until your stitches or staples have been removed. · Ask your doctor when you can drive again. It may take up to 8 weeks after knee replacement surgery before it is safe for you to drive. · When you get into a car, sit on the edge of the seat. Then pull in your legs, and turn to face the front. · You should be able to do many everyday activities 3 to 6 weeks after your surgery. You will probably need to take 4 to 16 weeks off from work. When you can go back to work depends on the type of work you do and how you feel. · Ask your doctor when it is okay for you to have sex. · Do not lift anything heavier than 10 pounds and do not lift weights for 12 weeks. Diet  · By the time you leave the hospital, you should be eating your normal diet. If your stomach is upset, try bland, low-fat foods like plain rice, broiled chicken, toast, and yogurt. Your doctor may suggest that you take iron and vitamin supplements. · Drink plenty of fluids (unless your doctor tells you not to). · Eat healthy foods, and watch your portion sizes. Try to stay at your ideal weight. Too much weight puts more stress on your new knee. · You may notice that your bowel movements are not regular right after your surgery. This is common. Try to avoid constipation and straining with bowel movements. You may want to take a fiber supplement every day. If you have not had a bowel movement after a couple of days, ask your doctor about taking a mild laxative. Medicines  · Your doctor will tell you if and when you can restart your medicines. He or she will also give you instructions about taking any new medicines.   · If you take blood thinners, such as warfarin (Coumadin), clopidogrel (Plavix), or aspirin, be sure to talk to your doctor. He or she will tell you if and when to start taking those medicines again. Make sure that you understand exactly what your doctor wants you to do. · Your doctor may give you a blood-thinning medicine to prevent blood clots. If you take a blood thinner, be sure you get instructions about how to take your medicine safely. Blood thinners can cause serious bleeding problems. This medicine could be in pill form or as a shot (injection). If a shot is necessary, your doctor will tell you how to do this. · Be safe with medicines. Take pain medicines exactly as directed. ¨ If the doctor gave you a prescription medicine for pain, take it as prescribed. ¨ If you are not taking a prescription pain medicine, ask your doctor if you can take an over-the-counter medicine. ¨ Plan to take your pain medicine 30 minutes before exercises. It is easier to prevent pain before it starts than to stop it once it has started. · If you think your pain medicine is making you sick to your stomach:  ¨ Take your medicine after meals (unless your doctor has told you not to). ¨ Ask your doctor for a different pain medicine. · If your doctor prescribed antibiotics, take them as directed. Do not stop taking them just because you feel better. You need to take the full course of antibiotics. Incision care  · You will have a bandage over the cut (incision) and staples or stitches. Take the bandage off when your doctor says it is okay. · Your doctor will remove the staples or stitches 10 days to 3 weeks after the surgery and replace them with strips of tape. Leave the tape on for a week or until it falls off. Exercise  · Your rehab program will give you a number of exercises to do to help you get back your knee's range of motion and strength. Always do them as your therapist tells you.   Ice and elevation  · For pain and swelling, put ice or a cold pack on the area for 10 to 20 minutes at a time. Put a thin cloth between the ice and your skin. Other instructions  · Continue to wear your support stockings as your doctor says. These help to prevent blood clots. The length of time that you will have to wear them depends on your activity level and the amount of swelling. · Wear medical alert jewelry that says you may need antibiotics before any procedure, including dental work. You can buy this at most drugstores. · You have metal pieces in your knee. These may set off some airport metal detectors. Carry a medical alert card that says you have an artificial joint, just in case. Follow-up care is a key part of your treatment and safety. Be sure to make and go to all appointments, and call your doctor if you are having problems. It's also a good idea to know your test results and keep a list of the medicines you take. When should you call for help? Call 911 anytime you think you may need emergency care. For example, call if:  · You passed out (lost consciousness). · You have severe trouble breathing. · You have sudden chest pain and shortness of breath, or you cough up blood. Call your doctor now or seek immediate medical care if:  · You have signs of infection, such as:  ¨ Increased pain, swelling, warmth, or redness. ¨ Red streaks leading from the incision. ¨ Pus draining from the incision. ¨ A fever. · You have signs of a blood clot, such as:  ¨ Pain in your calf, back of the knee, thigh, or groin. ¨ Redness and swelling in your leg or groin. · Your incision comes open and begins to bleed, or the bleeding increases. · You have pain that does not get better after you take pain medicine. Watch closely for changes in your health, and be sure to contact your doctor if:  · You do not have a bowel movement after taking a laxative. Where can you learn more? Go to http://jacob-jared.info/.   Enter Q812 in the search box to learn more about \"Total Knee Replacement: What to Expect at Home. \"  Current as of: March 21, 2017  Content Version: 11.3  © 7805-1033 [employer]. Care instructions adapted under license by [employer] (which disclaims liability or warranty for this information). If you have questions about a medical condition or this instruction, always ask your healthcare professional. [address] any warranty or liability for your use of this information. These are general instructions for a healthy lifestyle:    No smoking/ No tobacco products/ Avoid exposure to second hand smoke    Surgeon General's Warning:  Quitting smoking now greatly reduces serious risk to your health. Obesity, smoking, and sedentary lifestyle greatly increases your risk for illness    A healthy diet, regular physical exercise & weight monitoring are important for maintaining a healthy lifestyle    You may be retaining fluid if you have a history of heart failure or if you experience any of the following symptoms:  Weight gain of 3 pounds or more overnight or 5 pounds in a week, increased swelling in our hands or feet or shortness of breath while lying flat in bed. Please call your doctor as soon as you notice any of these symptoms; do not wait until your next office visit. Recognize signs and symptoms of STROKE:    F-face looks uneven    A-arms unable to move or move even    S-speech slurred or non-existent    T-time-call 911 as soon as signs and symptoms begin-DO NOT go       Back to bed or wait to see if you get better-TIME IS BRAIN. The discharge information has been reviewed with the patient. The patient verbalized understanding. Information obtained by :  I understand that if any problems occur once I am at home I am to contact my physician. I understand and acknowledge receipt of the instructions indicated above. Physician's or R.N.'s Signature                                                                  Date/Time                                                                                                                                              Patient or Representative Signature                                                          Date/Time

## 2017-06-29 NOTE — PROGRESS NOTES
Pt receiving 2nd unit PRBCS as ordered. Pt assessment unchanged. VSS. Appetite good. Pt discharge summary and home medication sheet reviewed and signed by pt. RX for oxycodone and po phenergan given . Copy of all inst given for take home use. Pt voiding well. Pt will be discharged after PRBCS transfusion. inst pt to call for any needs.

## 2017-06-29 NOTE — PROGRESS NOTES
2017         Post Op day: 2 Days Post-Op     Admit Date: 2017  Admit Diagnosis: Unilateral primary osteoarthritis, right knee [M17.11]        Subjective: Doing well, No complaints, No SOB, No Chest Pain, No Nausea or Vomiting     Objective:   Vital Signs are Stable, No Acute Distress, Alert and Oriented, Dressing is Dry,  Neurovascular exam is normal.     Assessment / Plan :  Patient Active Problem List   Diagnosis Code    Arthritis of knee, right M17.11    S/P total knee arthroplasty Z96.659    Patient Vitals for the past 8 hrs:   BP Temp Pulse Resp SpO2   17 0500 101/51 98.6 °F (37 °C) 81 18 93 %   17 0010 106/60 97.6 °F (36.4 °C) 89 18 96 %    Temp (24hrs), Av °F (36.7 °C), Min:97.3 °F (36.3 °C), Max:98.8 °F (37.1 °C)    Body mass index is 22.04 kg/(m^2).     Continue PT  Lab Results   Component Value Date/Time    HGB 7.6 2017 04:57 AM    Monitor HGB   Pt seen by and discussed with Supervising Physician   Transfuse 2 units prbc  Home this afternoon     Signed By: SONDRA Cronin

## 2017-06-29 NOTE — PROGRESS NOTES
Problem: Mobility Impaired (Adult and Pediatric)  Goal: *Acute Goals and Plan of Care (Insert Text)  GOALS (1-4 days):  (1.)Ms. Junior Fuller will move from supine to sit and sit to supine in bed with STAND BY ASSIST.  (2.)Ms. Junior Fuller will transfer from bed to chair and chair to bed with STAND BY ASSIST using the least restrictive device. (3.)Ms. Junior Fuller will ambulate with STAND BY ASSIST for 200 feet with the least restrictive device. (4.)Ms. Junior Fuller will ambulate up/down 3 steps with bilateral railing with CONTACT GUARD ASSIST with no device. (5.)Ms. Junior Fuller will increase right knee ROM to 5°-80°.  ________________________________________________________________________________________________       PHYSICAL THERAPY JOINT CAMP TKA: Daily Note, Treatment Day: 2nd and PM 6/29/2017  INPATIENT: Hospital Day: 3  Payor: Josephine Rinaldi / Plan: [address] / Product Type: PPO /      NAME/AGE/GENDER: Jessica Daugherty is a 57 y.o. female             PRIMARY DIAGNOSIS:  Unilateral primary osteoarthritis, right knee [M17.11]              Procedure(s) and Anesthesia Type:     * KNEE ARTHROPLASTY TOTAL/ RIGHT/ SAIRA - Spinal (Right)  ICD-10: Treatment Diagnosis:        · Pain in Right Knee (M25.561)  · Stiffness of Right Knee, Not elsewhere classified (M25.661)  · Difficulty in walking, Not elsewhere classified (R26.2)  · Other abnormalities of gait and mobility (R26.89)       ASSESSMENT:      Ms. Junior Fuller supine on contact and blood actively transfusing, pt let PT know that her IV site already had to be switched. To avoid compromising IV site forfeited gait training this afternoon but worked on her TKA exercises in supine reviewing HEP along the way. Pt pretty sure she will go home later this evening when blood has finished. Home health PT to follow up with pt. This section established at most recent assessment   PROBLEM LIST (Impairments causing functional limitations):  1. Decreased Strength  2.  Decreased ADL/Functional Activities  3. Decreased Transfer Abilities  4. Decreased Ambulation Ability/Technique  5. Decreased Flexibility/Joint Mobility  6. Decreased [place] with Home Exercise Program    INTERVENTIONS PLANNED: (Benefits and precautions of physical therapy have been discussed with the patient.)  1. Bed Mobility  2. Gait Training  3. Home Exercise Program (HEP)  4. Therapeutic Exercise/Strengthening  5. Transfer Training  6. Range of Motion: active/assisted/passive  7. Therapeutic Activities  8. Group Therapy      TREATMENT PLAN: Frequency/Duration: Follow patient BID   to address above goals. Rehabilitation Potential For Stated Goals: GOOD      RECOMMENDED REHABILITATION/EQUIPMENT: (at time of discharge pending progress): Continue Skilled Therapy and Home Health: Physical Therapy. HISTORY:   History of Present Injury/Illness (Reason for Referral):  S/P R TKA  Past Medical History/Comorbidities:   Ms. Adria Gentile  has a past medical history of Migraine and Osteopenia. She also has no past medical history of Adverse effect of anesthesia; Difficult intubation; Malignant hyperthermia due to anesthesia; Nausea & vomiting; or Pseudocholinesterase deficiency. Ms. Adria Gentile  has a past surgical history that includes orthopaedic (1996) and hysterectomy. Social History/Living Environment:   Home Environment: Private residence  # Steps to Enter: 2  One/Two Story Residence: One story  Living Alone: No  Support Systems: Spouse/Significant Other/Partner, Child(katharine)  Patient Expects to be Discharged to[de-identified] Private residence  Current DME Used/Available at Home: [address], bedside, [address] [facility] chair  Tub or Shower Type: Shower  Prior Level of Function/Work/Activity:  Independent with gait and ADLs.    Number of Personal Factors/Comorbidities that affect the Plan of Care: 0: LOW COMPLEXITY   EXAMINATION:   Most Recent Physical Functioning:                RLE AROM  R Knee Flexion: 90  R Knee Extension: 4 Transfers  Sit to Stand: Stand-by asssistance  Stand to Sit: Stand-by asssistance     Balance  Sitting: Intact  Standing: Intact; With support          Gait Training: No     Weight Bearing Status  Right Side Weight Bearing: As tolerated  Distance (ft): 142 Feet (ft) (and another 80 feet)  Ambulation - Level of Assistance: Stand-by asssistance  Assistive Device: Walker, rolling  Speed/Susie: Pace decreased (<100 feet/min)  Step Length: Left shortened  Stance: Right decreased  Gait Abnormalities: Antalgic;Decreased step clearance  Number of Stairs Trained: 3  Stairs - Level of Assistance: Contact guard assistance  Rail Use: Both  Interventions: Safety awareness training;Verbal cues      Braces/Orthotics: none     Right Knee Cold  Type: Cryocuff       Body Structures Involved:  1. Bones  2. Joints  3. Muscles Body Functions Affected:  1. Neuromusculoskeletal  2. Movement Related Activities and Participation Affected:  1. General Tasks and Demands  2. Mobility  3. Self Care   Number of elements that affect the Plan of Care: 3: MODERATE COMPLEXITY   CLINICAL PRESENTATION:   Presentation: Stable and uncomplicated: LOW COMPLEXITY   CLINICAL DECISION MAKIN[address] [place], [place] [id number] AM-PAC 6 Clicks   Basic Mobility Inpatient Short Form  How much difficulty does the patient currently have. .. Unable A Lot A Little None   1. Turning over in bed (including adjusting bedclothes, sheets and blankets)? [ ] 1   [ ] 2   [ ] 3   [X] 4   2. Sitting down on and standing up from a chair with arms ( e.g., wheelchair, bedside commode, etc.)   [ ] 1   [ ] 2   [X] 3   [ ] 4   3. Moving from lying on back to sitting on the side of the bed? [ ] 1   [ ] 2   [X] 3   [ ] 4   How much help from another person does the patient currently need. .. Total A Lot A Little None   4. Moving to and from a bed to a chair (including a wheelchair)? [ ] 1   [ ] 2   [X] 3   [ ] 4   5. Need to walk in hospital room?    [ ] 1   [ ] 2   [X] 3 [ ] 4   6. Climbing 3-5 steps with a railing? [ ] 1   [X] 2   [ ] 3   [ ] 4   © 2007, Trustees of [address] [place] [address] [address] [address], under license to [email or web address]. All rights reserved       Score:  Initial: 18 Most Recent: X (Date: -- )     Interpretation of Tool:  Represents activities that are increasingly more difficult (i.e. Bed mobility, Transfers, Gait). Score 24 23 22-20 19-15 14-10 9-7 6       Modifier CH CI CJ CK CL CM CN         · Mobility - Walking and Moving Around:               - CURRENT STATUS:    CK - 40%-59% impaired, limited or restricted               - GOAL STATUS:           CJ - 20%-39% impaired, limited or restricted               - D/C STATUS:                       ---------------To be determined---------------  Payor: BLUE CROSS / Plan: [place]OSS [facility] / Product Type: PPO /       Medical Necessity:     · Patient demonstrates good rehab potential due to higher previous functional level. Reason for Services/Other Comments:  · Patient continues to require present interventions due to patient's inability to perform functional mobility and exercises independently. Use of outcome tool(s) and clinical judgement create a POC that gives a: Clear prediction of patient's progress: LOW COMPLEXITY                 TREATMENT:         Pre-treatment Symptoms/Complaints:  none  Pain: Initial: no complaints  Pain Intensity 1:  (not rating, but pain with therapy)  Post Session:  No complaints      Gait Training (15 Minutes):  Gait training to improve and/or restore physical functioning as related to mobility and strength. Ambulated 142 Feet (ft) (and another 80 feet) with Stand-by asssistance using a Walker, rolling and minimal Safety awareness training;Verbal cues related to their stance phase and stride length to promote proper body alignment and promote proper body posture.   Instruction in performance of walker use and gait sequencing to correct stance phase and stride length. Therapeutic Exercise: (15 Minutes):  Exercises per grid below to improve mobility and strength. Required minimal verbal cues to promote proper body alignment and promote proper body posture. Progressed repetitions as indicated. Date:   6/28/17 Date:  6/29/17  Date:      ACTIVITY/EXERCISE AM PM AM PM AM PM   GROUP THERAPY  [ ]  Jude.Roby ]  Joan ]  [ ]  [ ]  [ ]   Ankle Pumps 10  15   20  20       Quad Sets  10  15  20  20       Gluteal Sets  10  15  20  20       Hip ABd/ADduction  10  15  20  20       Straight Leg Raises  10  15  20  20       Knee Slides  10  15  20  20       Short Arc Quads    15  20  20       Long Arc Quads               Chair Slides    15  20  20                       B = bilateral; AA = active assistive; A = active; P = passive       Treatment/Session Assessment:         Response to Treatment:  Worked on exercises while blood transfusing     Education:  [ ] Home Exercises  [X] Fall Precautions   [ ] Going Home Video  [ ] Knee Prosthesis Review  [X] Walker Management/Safety [ ] Adaptive Equipment as Needed         Interdisciplinary Collaboration:   · Registered Nurse     After treatment position/precautions:   · Supine in bed, Bed/Chair-wheels locked, Bed in low position and Call light within reach     Compliance with Program/Exercises: compliant all the time. Recommendations/Intent for next treatment session:  Treatment next visit will focus on increasing Ms. Mota's independence with bed mobility, transfers, gait training, strength/ROM exercises, modalities for pain, and patient education.        Total Treatment Duration:  PT Patient Time In/Time Out  Time In: 1440  Time Out: [address], [address], PT

## 2017-06-30 ENCOUNTER — HOME CARE VISIT (OUTPATIENT)
Dept: SCHEDULING | Facility: HOME HEALTH | Age: 62
End: 2017-06-30
Payer: COMMERCIAL

## 2017-06-30 VITALS
HEIGHT: 66 IN | DIASTOLIC BLOOD PRESSURE: 58 MMHG | HEART RATE: 72 BPM | SYSTOLIC BLOOD PRESSURE: 108 MMHG | TEMPERATURE: 97.5 F | RESPIRATION RATE: 15 BRPM

## 2017-06-30 LAB
ABO + RH BLD: NORMAL
BLD PROD TYP BPU: NORMAL
BLD PROD TYP BPU: NORMAL
BLOOD GROUP ANTIBODIES SERPL: NORMAL
BPU ID: NORMAL
BPU ID: NORMAL
CROSSMATCH RESULT,%XM: NORMAL
CROSSMATCH RESULT,%XM: NORMAL
SPECIMEN EXP DATE BLD: NORMAL
STATUS OF UNIT,%ST: NORMAL
STATUS OF UNIT,%ST: NORMAL
UNIT DIVISION, %UDIV: 0
UNIT DIVISION, %UDIV: 0

## 2017-06-30 PROCEDURE — 400013 HH SOC

## 2017-06-30 PROCEDURE — G0151 HHCP-SERV OF PT,EA 15 MIN: HCPCS

## 2017-06-30 NOTE — PROGRESS NOTES
Transfusion completed - vital signs stable and no sign of transfusion reaction. Iv discontinued with catheter intact. Dressing given to pt with instructions to not open and give to home care for dressing change at home. Pt discharged via wheelchair with  at side. Denies any questions or concerns, states has already been given discharge instructions.

## 2017-07-04 ENCOUNTER — HOME CARE VISIT (OUTPATIENT)
Dept: SCHEDULING | Facility: HOME HEALTH | Age: 62
End: 2017-07-04
Payer: COMMERCIAL

## 2017-07-04 VITALS
TEMPERATURE: 98.5 F | DIASTOLIC BLOOD PRESSURE: 60 MMHG | HEART RATE: 80 BPM | SYSTOLIC BLOOD PRESSURE: 105 MMHG | RESPIRATION RATE: 16 BRPM

## 2017-07-04 PROCEDURE — G0157 HHC PT ASSISTANT EA 15: HCPCS

## 2017-07-05 ENCOUNTER — HOME CARE VISIT (OUTPATIENT)
Dept: SCHEDULING | Facility: HOME HEALTH | Age: 62
End: 2017-07-05
Payer: COMMERCIAL

## 2017-07-05 VITALS
DIASTOLIC BLOOD PRESSURE: 60 MMHG | TEMPERATURE: 97.3 F | RESPIRATION RATE: 16 BRPM | SYSTOLIC BLOOD PRESSURE: 105 MMHG | HEART RATE: 80 BPM

## 2017-07-05 PROCEDURE — G0157 HHC PT ASSISTANT EA 15: HCPCS

## 2017-07-07 ENCOUNTER — HOME CARE VISIT (OUTPATIENT)
Dept: SCHEDULING | Facility: HOME HEALTH | Age: 62
End: 2017-07-07
Payer: COMMERCIAL

## 2017-07-07 VITALS
RESPIRATION RATE: 16 BRPM | SYSTOLIC BLOOD PRESSURE: 105 MMHG | HEART RATE: 80 BPM | TEMPERATURE: 98.3 F | DIASTOLIC BLOOD PRESSURE: 60 MMHG

## 2017-07-07 PROCEDURE — A4649 SURGICAL SUPPLIES: HCPCS

## 2017-07-07 PROCEDURE — G0157 HHC PT ASSISTANT EA 15: HCPCS

## 2017-07-07 PROCEDURE — A4247 BETADINE/IODINE SWABS/WIPES: HCPCS

## 2017-07-10 ENCOUNTER — HOME CARE VISIT (OUTPATIENT)
Dept: SCHEDULING | Facility: HOME HEALTH | Age: 62
End: 2017-07-10
Payer: COMMERCIAL

## 2017-07-10 VITALS
HEART RATE: 86 BPM | RESPIRATION RATE: 16 BRPM | DIASTOLIC BLOOD PRESSURE: 60 MMHG | TEMPERATURE: 97.6 F | SYSTOLIC BLOOD PRESSURE: 95 MMHG

## 2017-07-10 PROCEDURE — G0157 HHC PT ASSISTANT EA 15: HCPCS

## 2017-07-12 ENCOUNTER — HOME CARE VISIT (OUTPATIENT)
Dept: SCHEDULING | Facility: HOME HEALTH | Age: 62
End: 2017-07-12
Payer: COMMERCIAL

## 2017-07-12 VITALS
DIASTOLIC BLOOD PRESSURE: 60 MMHG | RESPIRATION RATE: 17 BRPM | SYSTOLIC BLOOD PRESSURE: 105 MMHG | HEART RATE: 70 BPM | TEMPERATURE: 97.9 F

## 2017-07-12 PROCEDURE — G0157 HHC PT ASSISTANT EA 15: HCPCS

## 2017-07-14 ENCOUNTER — HOME CARE VISIT (OUTPATIENT)
Dept: SCHEDULING | Facility: HOME HEALTH | Age: 62
End: 2017-07-14
Payer: COMMERCIAL

## 2017-07-14 VITALS
DIASTOLIC BLOOD PRESSURE: 70 MMHG | TEMPERATURE: 97.9 F | RESPIRATION RATE: 16 BRPM | SYSTOLIC BLOOD PRESSURE: 102 MMHG | HEART RATE: 80 BPM

## 2017-07-14 PROCEDURE — G0157 HHC PT ASSISTANT EA 15: HCPCS

## 2017-07-17 ENCOUNTER — HOME CARE VISIT (OUTPATIENT)
Dept: SCHEDULING | Facility: HOME HEALTH | Age: 62
End: 2017-07-17
Payer: COMMERCIAL

## 2017-07-17 VITALS
RESPIRATION RATE: 16 BRPM | DIASTOLIC BLOOD PRESSURE: 70 MMHG | HEART RATE: 80 BPM | SYSTOLIC BLOOD PRESSURE: 100 MMHG | TEMPERATURE: 97.7 F

## 2017-07-17 PROCEDURE — G0157 HHC PT ASSISTANT EA 15: HCPCS

## 2017-07-20 ENCOUNTER — HOME CARE VISIT (OUTPATIENT)
Dept: SCHEDULING | Facility: HOME HEALTH | Age: 62
End: 2017-07-20
Payer: COMMERCIAL

## 2017-07-20 PROCEDURE — G0151 HHCP-SERV OF PT,EA 15 MIN: HCPCS

## 2017-07-21 VITALS
RESPIRATION RATE: 14 BRPM | SYSTOLIC BLOOD PRESSURE: 102 MMHG | HEART RATE: 72 BPM | TEMPERATURE: 98.2 F | DIASTOLIC BLOOD PRESSURE: 68 MMHG

## 2017-07-27 ENCOUNTER — HOSPITAL ENCOUNTER (OUTPATIENT)
Dept: PHYSICAL THERAPY | Age: 62
Discharge: HOME | End: 2017-07-27
Payer: COMMERCIAL

## 2017-07-27 PROCEDURE — 97162 PT EVAL MOD COMPLEX 30 MIN: CPT

## 2017-07-27 PROCEDURE — 97110 THERAPEUTIC EXERCISES: CPT

## 2017-07-27 NOTE — PROGRESS NOTES
Mckayla Drake  : 1955 [address][birth year] [address]  [phone]   [phone]        OUTPATIENT PHYSICAL THERAPY:Initial Assessment 2017      ICD-10: Treatment Diagnosis: Pain in right knee (M25.561) , Stiffness of right knee, not elsewhere classified (M25.661)  Precautions/Allergies:   Morphine   Fall Risk Score: 0 (? 5 = High Risk)  MD Orders: Eval and Treat  MEDICAL/REFERRING DIAGNOSIS:  Total knee replacement status [Z96.659]   DATE OF ONSET:    REFERRING PHYSICIAN: Madeline Wilder,*  RETURN PHYSICIAN APPOINTMENT: October      INITIAL ASSESSMENT:  Ms. Francisco English presents to therapy with pain and stiffness in the R knee s/p TKA secondary to OA. Pt is having swelling, decreased ROM and strength as well as difficulty with functional activities and ambulation due to the surgery. Pt would benefit from skilled PT for above deficits to return to prior level of function painfree. PROBLEM LIST (Impacting functional limitations):  1. Decreased Strength  2. Decreased ADL/Functional Activities  3. Decreased Balance  4. Increased Pain  5. Decreased Activity Tolerance  6. Decreased Flexibility/Joint Mobility INTERVENTIONS PLANNED:  1. Balance Exercise  2. Cold  3. Electrical Stimulation  4. Heat  5. Home Exercise Program (HEP)  6. Manual Therapy  7. Range of Motion (ROM)  8. Therapeutic Exercise/Strengthening  9. Whirlpool   TREATMENT PLAN:  Effective Dates: 17 TO 10-20-17. Frequency/Duration: 2-3 times a week for 12 weeks  GOALS: (Goals have been discussed and agreed upon with patient.)  Short-Term Functional Goals: Time Frame: 4 weeks   1. Ms. Francisco English to be independent with HEP. 2. Pt able to tolerate ambulation and stairs without pain in the R knee and no antalgia. 3. Pt to increase AROM in the R knee to 0-110 ° to improve functional mobility. Discharge Goals: Time Frame: 12 weeks   1.  Pt able to tolerate all functional activities independent and without limitations and painfree. 2. Pt able to return to work without pain and no limitations. 3. Pt strength to improve to 4+/5 overall in the R knee to continue to improve with independent HEP. 4. Pt AROM in the R knee to 0-120 ° to return to functional activities without limitations. Rehabilitation Potential For Stated Goals: Excellent  Regarding Deisy Mota's therapy, I certify that the treatment plan above will be carried out by a therapist or under their direction. Thank you for this referral,  Marya Doshi, PT, DPT       Referring Physician Signature: Kell Leigh,*              Date                      HISTORY:   History of Present Injury/Illness (Reason for Referral):  Pt 56 y/o F with pain in the R knee s/p R TKA secondary to OA. Pt stated the surgery was done 1 month ago and she had home health once home for a few weeks. She is still very limited in flexion but is complete with extension. She is still having swelling and the incision site is not fully healed superficially. Pt is having difficulty with ambulation and stairs and performing sit to stands from lower chairs. She is sleeping ok but has a difficult time positioning in the bed due to the tenderness in the knee. Pt describes tightness in the medial portion of the knee but also had to have some extra work done due to a previous ACL repair surgery a while ago. Past Medical History/Comorbidities:   Ms. Portillo Cleaning  has a past medical history of Migraine and Osteopenia. She also has no past medical history of Adverse effect of anesthesia; Difficult intubation; Malignant hyperthermia due to anesthesia; Nausea & vomiting; or Pseudocholinesterase deficiency. Ms. Portillo Cleaning  has a past surgical history that includes orthopaedic (1996) and hysterectomy.   Social History/Living Environment:     Lives with    Prior Level of Function/Work/Activity:       Works as a massage therapist at the [employer]   Previous Treatment Approaches:          Home health for 4 weeks   Personal Factors:          Profession:  Massage therapist   Current Medications:    Current Outpatient Prescriptions:     docusate sodium (STOOL SOFTENER) 100 mg capsule, Take 300 mg by mouth daily (after breakfast). , Disp: , Rfl:     aspirin delayed-release 325 mg tablet, Take 1 Tab by mouth every twelve (12) hours every twelve (12) hours. , Disp: 120 Tab, Rfl: 0    oxyCODONE IR (ROXICODONE) 10 mg tab immediate release tablet, Take 1 Tab by mouth every four (4) hours as needed. Max Daily Amount: 60 mg., Disp: 60 Tab, Rfl: 0    promethazine (PHENERGAN) 25 mg tablet, Take 1 Tab by mouth every six (6) hours as needed for Nausea., Disp: 50 Tab, Rfl: 0    levothyroxine (SYNTHROID) 125 mcg tablet, Take 125 mcg by mouth Daily (before breakfast). Indications: hypothyroidism, Disp: , Rfl:    Date Last Reviewed:  7/27/2017   # of Personal Factors/Comorbidities that affect the Plan of Care: 1-2: MODERATE COMPLEXITY   EXAMINATION:   Observation/Orthostatic Postural Assessment:          Good   Palpation:          Tenderness over the medial portion and incision site   ROM:     L knee      Flexion: 130 °       Extension: 0 °    R knee      Flexion: 84 °       Extension: 0 °    Strength:     L knee       Flexion: 5/5      Extension:  5/5   R knee       Flexion: 3-      Extension: 3-   Special Tests:          N/a   Neurological Screen:        Sensation: some c/o of numbness due to the swelling and tightness   Functional Mobility:         Gait/Ambulation:  Independent with some antalgia         Transfers:  Independent with hands from lower surface         Stairs:  Using step to gait pattern and HRS  Balance:          Good    Body Structures Involved:  1. Joints  2. Muscles Body Functions Affected:  1. Sensory/Pain  2. Movement Related Activities and Participation Affected:  1.  Mobility   # of elements that affect the Plan of Care: 4+: HIGH COMPLEXITY   CLINICAL PRESENTATION:   Presentation: Evolving clinical presentation with changing clinical characteristics: MODERATE COMPLEXITY   CLINICAL DECISION MAKING:   Outcome Measure: Tool Used: Lower Extremity Functional Scale (LEFS)  Score:  Initial: 45/80 Most Recent: X/80 (Date: -- )   Interpretation of Score: 20 questions each scored on a 5 point scale with 0 representing \"extreme difficulty or unable to perform\" and 4 representing \"no difficulty\". The lower the score, the greater the functional disability. 80/80 represents no disability. Minimal detectable change is 9 points. Score 80 79-63 62-48 47-32 31-16 15-1 0   Modifier CH CI CJ CK CL CM CN         Medical Necessity:   · Patient is expected to demonstrate progress in strength, range of motion and balance to increase independence with functional activities painfree . Reason for Services/Other Comments:  · Patient continues to require present interventions due to patient's inability to perform functional activities painfree. .   Use of outcome tool(s) and clinical judgement create a POC that gives a: Questionable prediction of patient's progress: MODERATE COMPLEXITY   TREATMENT:   (In addition to Assessment/Re-Assessment sessions the following treatments were rendered)  THERAPEUTIC ACTIVITY: ( see below for minutes): Therapeutic activities per grid below to improve mobility and strength. Required minimal visual and verbal cues to promote dynamic balance in standing. THERAPEUTIC EXERCISE: (see below for minutes):  Exercises per grid below to improve mobility and strength. Required minimal verbal cues to promote proper body alignment and promote proper body mechanics. Progressed resistance, range and repetitions as indicated. MANUAL THERAPY: (see below for minutes): Joint mobilization and Soft tissue mobilization was utilized and necessary because of the patient's restricted joint motion and restricted motion of soft tissue.    MODALITIES: (see below for minutes):      see chart below for details. Date: 7-27-17       Modalities:                                Therapeutic Exercise: 15 mins        4 way SLR  3x10        HS stretch with strap  2x30SH       Heel slides with strap  x20                                Proprioceptive Activities:                                Manual Therapy:                        Functional Activities:                                        HEP: Pt was educated on scar massage and told to continue with 4 way SLR as well as patellar mobs and stretching. Treatment/Session Assessment:  Pt has a pool at home and would like some aquatic exercises for home. She is still healing so is unable to get in the pool at this time. Pt would benefit more from land than pool therapy at this point due to needing ROM and strengthening and isnt having a lot of pain. · Pain/ Symptoms: Initial:   6/10 \"im hurting mostly in the medial side  Post Session:  5/10  ·   Compliance with Program/Exercises: compliant all of the time. · Recommendations/Intent for next treatment session: \"Next visit will focus on advancements to more challenging activities\".   Total Treatment Duration:        Eb Deng, PT, DPT

## 2017-07-27 NOTE — PROGRESS NOTES
Ambulatory/Rehab Services H2 Model Falls Risk Assessment    Risk Factor Pts. ·   Confusion/Disorientation/Impulsivity  []    4 ·   Symptomatic Depression  []   2 ·   Altered Elimination  []   1 ·   Dizziness/Vertigo  []   1 ·   Gender (Male)  []   1 ·   Any administered antiepileptics (anticonvulsants):  []   2 ·   Any administered benzodiazepines:  []   1 ·   Visual Impairment (specify):  []   1 ·   Portable Oxygen Use  []   1 ·   Orthostatic ? BP  []   1 ·   History of Recent Falls (within 3 mos.)  []   5     Ability to Rise from Chair (choose one) Pts. ·   Ability to rise in a single movement  [x]   0 ·   Pushes up, successful in one attempt  []   1 ·   Multiple attempts, but successful  []   3 ·   Unable to rise without assistance  []   4   Total: (5 or greater = High Risk) 0     Falls Prevention Plan:   []                Physical Limitations to Exercise (specify):   []                Mobility Assistance Device (type):   []                Exercise/Equipment Adaptation (specify):    ©2010 [facility] [address] Patent #[id number].  Federal Law prohibits the replication, distribution or use without written permission from [facility] [place]

## 2017-08-01 ENCOUNTER — HOSPITAL ENCOUNTER (OUTPATIENT)
Dept: PHYSICAL THERAPY | Age: 62
Discharge: HOME | End: 2017-08-01
Payer: COMMERCIAL

## 2017-08-01 PROCEDURE — 97110 THERAPEUTIC EXERCISES: CPT

## 2017-08-01 PROCEDURE — 97140 MANUAL THERAPY 1/> REGIONS: CPT

## 2017-08-01 PROCEDURE — 97014 ELECTRIC STIMULATION THERAPY: CPT

## 2017-08-01 NOTE — PROGRESS NOTES
Sherice Aviles  : 1955 [id number] [address].  [phone]   [phone]        OUTPATIENT PHYSICAL THERAPY:Daily Note 2017      ICD-10: Treatment Diagnosis: Pain in right knee (M25.561) , Stiffness of right knee, not elsewhere classified (M25.661)  Precautions/Allergies:   Morphine   Fall Risk Score: 0 (? 5 = High Risk)  MD Orders: Eval and Treat  MEDICAL/REFERRING DIAGNOSIS:  Total knee replacement status [Z96.659]   DATE OF ONSET:    REFERRING PHYSICIAN: Vangie Dela Cruz,*  RETURN PHYSICIAN APPOINTMENT: October      INITIAL ASSESSMENT:  Ms. Jolynn Cardenas presents to therapy with pain and stiffness in the R knee s/p TKA secondary to OA. Pt is having swelling, decreased ROM and strength as well as difficulty with functional activities and ambulation due to the surgery. Pt would benefit from skilled PT for above deficits to return to prior level of function painfree. PROBLEM LIST (Impacting functional limitations):  1. Decreased Strength  2. Decreased ADL/Functional Activities  3. Decreased Balance  4. Increased Pain  5. Decreased Activity Tolerance  6. Decreased Flexibility/Joint Mobility INTERVENTIONS PLANNED:  1. Balance Exercise  2. Cold  3. Electrical Stimulation  4. Heat  5. Home Exercise Program (HEP)  6. Manual Therapy  7. Range of Motion (ROM)  8. Therapeutic Exercise/Strengthening  9. Whirlpool   TREATMENT PLAN:  Effective Dates: 17 TO 10-20-17. Frequency/Duration: 2-3 times a week for 12 weeks  GOALS: (Goals have been discussed and agreed upon with patient.)  Short-Term Functional Goals: Time Frame: 4 weeks   1. Ms. Jolynn Cardenas to be independent with HEP. 2. Pt able to tolerate ambulation and stairs without pain in the R knee and no antalgia. 3. Pt to increase AROM in the R knee to 0-110 ° to improve functional mobility. Discharge Goals: Time Frame: 12 weeks   1.  Pt able to tolerate all functional activities independent and without limitations and painfree. 2. Pt able to return to work without pain and no limitations. 3. Pt strength to improve to 4+/5 overall in the R knee to continue to improve with independent HEP. 4. Pt AROM in the R knee to 0-120 ° to return to functional activities without limitations. Rehabilitation Potential For Stated Goals: Excellent                HISTORY:   History of Present Injury/Illness (Reason for Referral):  Pt 58 y/o F with pain in the R knee s/p R TKA secondary to OA. Pt stated the surgery was done 1 month ago and she had home health once home for a few weeks. She is still very limited in flexion but is complete with extension. She is still having swelling and the incision site is not fully healed superficially. Pt is having difficulty with ambulation and stairs and performing sit to stands from lower chairs. She is sleeping ok but has a difficult time positioning in the bed due to the tenderness in the knee. Pt describes tightness in the medial portion of the knee but also had to have some extra work done due to a previous ACL repair surgery a while ago. Past Medical History/Comorbidities:   Ms. Shaneka Moore  has a past medical history of Migraine and Osteopenia. She also has no past medical history of Adverse effect of anesthesia; Difficult intubation; Malignant hyperthermia due to anesthesia; Nausea & vomiting; or Pseudocholinesterase deficiency. Ms. Shaneka Moore  has a past surgical history that includes orthopaedic (1996) and hysterectomy.   Social History/Living Environment:     Lives with    Prior Level of Function/Work/Activity:       Works as a massage therapist at [employer]   Previous Treatment Approaches:          Home health for 4 weeks   Personal Factors:          Profession:  Massage therapist   Current Medications:    Current Outpatient Prescriptions:     docusate sodium (STOOL SOFTENER) 100 mg capsule, Take 300 mg by mouth daily (after breakfast). , Disp: , Rfl:     aspirin delayed-release 325 mg tablet, Take 1 Tab by mouth every twelve (12) hours every twelve (12) hours. , Disp: 120 Tab, Rfl: 0    oxyCODONE IR (ROXICODONE) 10 mg tab immediate release tablet, Take 1 Tab by mouth every four (4) hours as needed. Max Daily Amount: 60 mg., Disp: 60 Tab, Rfl: 0    promethazine (PHENERGAN) 25 mg tablet, Take 1 Tab by mouth every six (6) hours as needed for Nausea., Disp: 50 Tab, Rfl: 0    levothyroxine (SYNTHROID) 125 mcg tablet, Take 125 mcg by mouth Daily (before breakfast). Indications: hypothyroidism, Disp: , Rfl:    Date Last Reviewed:  8/1/2017   EXAMINATION:   Observation/Orthostatic Postural Assessment:          Good   Palpation:          Tenderness over the medial portion and incision site   ROM:     L knee      Flexion: 130 °       Extension: 0 °    R knee      Flexion: 84 °       Extension: 0 °    Strength:     L knee       Flexion: 5/5      Extension:  5/5   R knee       Flexion: 3-      Extension: 3-   Special Tests:          N/a   Neurological Screen:        Sensation: some c/o of numbness due to the swelling and tightness   Functional Mobility:         Gait/Ambulation:  Independent with some antalgia         Transfers:  Independent with hands from lower surface         Stairs:  Using step to gait pattern and HRS  Balance:          Good    Body Structures Involved:  1. Joints  2. Muscles Body Functions Affected:  1. Sensory/Pain  2. Movement Related Activities and Participation Affected:  1. Mobility   CLINICAL PRESENTATION:   CLINICAL DECISION MAKING:   Outcome Measure: Tool Used: Lower Extremity Functional Scale (LEFS)  Score:  Initial: 45/80 Most Recent: X/80 (Date: -- )   Interpretation of Score: 20 questions each scored on a 5 point scale with 0 representing \"extreme difficulty or unable to perform\" and 4 representing \"no difficulty\". The lower the score, the greater the functional disability. 80/80 represents no disability. Minimal detectable change is 9 points. Score 80 79-63 62-48 47-32 31-16 15-1 0   Modifier CH CI CJ CK CL CM CN         Medical Necessity:   · Patient is expected to demonstrate progress in strength, range of motion and balance to increase independence with functional activities painfree . Reason for Services/Other Comments:  · Patient continues to require present interventions due to patient's inability to perform functional activities painfree. .   TREATMENT:   (In addition to Assessment/Re-Assessment sessions the following treatments were rendered)  THERAPEUTIC ACTIVITY: ( see below for minutes): Therapeutic activities per grid below to improve mobility and strength. Required minimal visual and verbal cues to promote dynamic balance in standing. THERAPEUTIC EXERCISE: (see below for minutes):  Exercises per grid below to improve mobility and strength. Required minimal verbal cues to promote proper body alignment and promote proper body mechanics. Progressed resistance, range and repetitions as indicated. MANUAL THERAPY: (see below for minutes): Joint mobilization and Soft tissue mobilization was utilized and necessary because of the patient's restricted joint motion and restricted motion of soft tissue. MODALITIES: (see below for minutes):      see chart below for details.       Date: 7-27-17 8-1-17      Modalities:  15 mins       IFC with ice   15 mins                       Therapeutic Exercise: 15 mins  25 mins       4 way SLR  3x10        HS stretch with strap  2x30SH 4x20SH       Heel slides with strap  x20        Quad stretch with strap   4x30SH       Bike   1/2 revolutions 5 mins       Walking march with holds   2L       slantboard   x60SH       Bridging   2x15       Proprioceptive Activities:                                Manual Therapy:  10 mins       STM with scar massage   5 mins       Patellar mobs   5 mins       Functional Activities:                                        HEP: Pt was educated on scar massage and told to continue with 4 way SLR as well as patellar mobs and stretching. Added bridging and quad stretch with strap. Treatment/Session Assessment:  Pt is still lacking a lot of flexion but was able to tolerate stretching prone today. Continue with POC. · Pain/ Symptoms: Initial:   2/10 \"I actually forgot to take my pain meds. \"  Post Session:  5/10  ·   Compliance with Program/Exercises: compliant all of the time. · Recommendations/Intent for next treatment session: \"Next visit will focus on advancements to more challenging activities\".   Total Treatment Duration:  PT Patient Time In/Time Out  Time In: 0200  Time Out: 0300     Roseline Powell, PT, DPT

## 2017-08-03 ENCOUNTER — HOSPITAL ENCOUNTER (OUTPATIENT)
Dept: PHYSICAL THERAPY | Age: 62
Discharge: HOME | End: 2017-08-03
Payer: COMMERCIAL

## 2017-08-03 PROCEDURE — 97014 ELECTRIC STIMULATION THERAPY: CPT

## 2017-08-03 PROCEDURE — 97110 THERAPEUTIC EXERCISES: CPT

## 2017-08-03 PROCEDURE — 97140 MANUAL THERAPY 1/> REGIONS: CPT

## 2017-08-03 NOTE — PROGRESS NOTES
Mckayla Drake  : [id number] [address].  [phone]   [phone]        OUTPATIENT PHYSICAL THERAPY:Daily Note 8/3/2017      ICD-10: Treatment Diagnosis: Pain in right knee (M25.561) , Stiffness of right knee, not elsewhere classified (M25.661)  Precautions/Allergies:   Morphine   Fall Risk Score: 0 (? 5 = High Risk)  MD Orders: Eval and Treat  MEDICAL/REFERRING DIAGNOSIS:  Total knee replacement status [Z96.659]   DATE OF ONSET:    REFERRING PHYSICIAN: Madeline Wilder,*  RETURN PHYSICIAN APPOINTMENT: October      INITIAL ASSESSMENT:  Ms. Francisco English presents to therapy with pain and stiffness in the R knee s/p TKA secondary to OA. Pt is having swelling, decreased ROM and strength as well as difficulty with functional activities and ambulation due to the surgery. Pt would benefit from skilled PT for above deficits to return to prior level of function painfree. PROBLEM LIST (Impacting functional limitations):  1. Decreased Strength  2. Decreased ADL/Functional Activities  3. Decreased Balance  4. Increased Pain  5. Decreased Activity Tolerance  6. Decreased Flexibility/Joint Mobility INTERVENTIONS PLANNED:  1. Balance Exercise  2. Cold  3. Electrical Stimulation  4. Heat  5. Home Exercise Program (HEP)  6. Manual Therapy  7. Range of Motion (ROM)  8. Therapeutic Exercise/Strengthening  9. Whirlpool   TREATMENT PLAN:  Effective Dates: 17 TO 10-20-17. Frequency/Duration: 2-3 times a week for 12 weeks  GOALS: (Goals have been discussed and agreed upon with patient.)  Short-Term Functional Goals: Time Frame: 4 weeks   1. Ms. Francisco English to be independent with HEP. 2. Pt able to tolerate ambulation and stairs without pain in the R knee and no antalgia. 3. Pt to increase AROM in the R knee to 0-110 ° to improve functional mobility. Discharge Goals: Time Frame: 12 weeks   1.  Pt able to tolerate all functional activities independent and without limitations and painfree. 2. Pt able to return to work without pain and no limitations. 3. Pt strength to improve to 4+/5 overall in the R knee to continue to improve with independent HEP. 4. Pt AROM in the R knee to 0-120 ° to return to functional activities without limitations. Rehabilitation Potential For Stated Goals: Excellent                HISTORY:   History of Present Injury/Illness (Reason for Referral):  Pt 58 y/o F with pain in the R knee s/p R TKA secondary to OA. Pt stated the surgery was done 1 month ago and she had home health once home for a few weeks. She is still very limited in flexion but is complete with extension. She is still having swelling and the incision site is not fully healed superficially. Pt is having difficulty with ambulation and stairs and performing sit to stands from lower chairs. She is sleeping ok but has a difficult time positioning in the bed due to the tenderness in the knee. Pt describes tightness in the medial portion of the knee but also had to have some extra work done due to a previous ACL repair surgery a while ago. Past Medical History/Comorbidities:   Ms. Livia Gonzalez  has a past medical history of Migraine and Osteopenia. She also has no past medical history of Adverse effect of anesthesia; Difficult intubation; Malignant hyperthermia due to anesthesia; Nausea & vomiting; or Pseudocholinesterase deficiency. Ms. Livia Gonzalez  has a past surgical history that includes orthopaedic (1996) and hysterectomy.   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 , Disp: , Rfl:     aspirin delayed-release 325 mg tablet, Take 1 Tab by mouth every twelve (12) hours every twelve (12) hours. , Disp: 120 Tab, Rfl: 0    oxyCODONE IR (ROXICODONE) 10 mg tab immediate release tablet, Take 1 Tab by mouth every four (4) hours as needed. Max Daily Amount: 60 mg., Disp: 60 Tab, Rfl: 0    promethazine (PHENERGAN) 25 mg tablet, Take 1 Tab by mouth every six (6) hours as needed for Nausea., Disp: 50 Tab, Rfl: 0    levothyroxine (SYNTHROID) 125 mcg tablet, Take 125 mcg by mouth Daily (before breakfast). Indications: hypothyroidism, Disp: , Rfl:    Date Last Reviewed:  8/3/2017   EXAMINATION:   Observation/Orthostatic Postural Assessment:          Good   Palpation:          Tenderness over the medial portion and incision site   ROM:     L knee      Flexion: 130 °       Extension: 0 °    R knee      Flexion: 84 °       Extension: 0 °    Strength:     L knee       Flexion: 5/5      Extension:  5/5   R knee       Flexion: 3-      Extension: 3-   Special Tests:          N/a   Neurological Screen:        Sensation: some c/o of numbness due to the swelling and tightness   Functional Mobility:         Gait/Ambulation:  Independent with some antalgia         Transfers:  Independent with hands from lower surface         Stairs:  Using step to gait pattern and HRS  Balance:          Good    Body Structures Involved:  1. Joints  2. Muscles Body Functions Affected:  1. Sensory/Pain  2. Movement Related Activities and Participation Affected:  1. Mobility   CLINICAL PRESENTATION:   CLINICAL DECISION MAKING:   Outcome Measure: Tool Used: Lower Extremity Functional Scale (LEFS)  Score:  Initial: 45/80 Most Recent: X/80 (Date: -- )   Interpretation of Score: 20 questions each scored on a 5 point scale with 0 representing \"extreme difficulty or unable to perform\" and 4 representing \"no difficulty\". The lower the score, the greater the functional disability. 80/80 represents no disability. Minimal detectable change is 9 points. Score 80 79-63 62-48 47-32 31-16 15-1 0   Modifier CH CI CJ CK CL CM CN         Medical Necessity:   · Patient is expected to demonstrate progress in strength, range of motion and balance to increase independence with functional activities painfree . Reason for Services/Other Comments:  · Patient continues to require present interventions due to patient's inability to perform functional activities painfree. .   TREATMENT:   (In addition to Assessment/Re-Assessment sessions the following treatments were rendered)  THERAPEUTIC ACTIVITY: ( see below for minutes): Therapeutic activities per grid below to improve mobility and strength. Required minimal visual and verbal cues to promote dynamic balance in standing. THERAPEUTIC EXERCISE: (see below for minutes):  Exercises per grid below to improve mobility and strength. Required minimal verbal cues to promote proper body alignment and promote proper body mechanics. Progressed resistance, range and repetitions as indicated. MANUAL THERAPY: (see below for minutes): Joint mobilization and Soft tissue mobilization was utilized and necessary because of the patient's restricted joint motion and restricted motion of soft tissue. MODALITIES: (see below for minutes):      see chart below for details.       Date: 7-27-17 8-1-17      Modalities:  15 mins  15 mins      IFC with ice   15 mins  15 mins                      Therapeutic Exercise: 15 mins  25 mins  25 mins      4 way SLR  3x10        HS stretch with strap  2x30SH 4x20SH  Repeat      Heel slides with strap  x20   Repeat      Quad stretch with strap   4x30SH  Repeat      Bike   1/2 revolutions 5 mins  Repeat      Walking HS stretch    2L      Walking march with holds   2L  2L      slantboard   x60SH       Bridging   2x15       Proprioceptive Activities:                                Manual Therapy:  10 mins  10 mins      STM with scar massage   5 mins  5 mins      Patellar mobs   5 mins  5 mins      Functional Activities:        Reverse walking on TM   1. 5mph 4 mins      Step up    6 in 2x10      Resisted side stepping    Red band 2L              HEP: Pt was educated on scar massage and told to continue with 4 way SLR as well as patellar mobs and stretching. Added bridging and quad stretch with strap. Treatment/Session Assessment:  Pt tolerated all exercises added today. Flexion is still lacking but pt was up to 94 ° flexion. Continue to work with flexion. · Pain/ Symptoms: Initial:   5/10 \"Im having a bad day today. Im a little discouraged. \"  Post Session:  2/10 \"It feels better after therapy>\"  ·   Compliance with Program/Exercises: compliant all of the time. · Recommendations/Intent for next treatment session: \"Next visit will focus on advancements to more challenging activities\".   Total Treatment Duration:  PT Patient Time In/Time Out  Time In: 0200  Time Out: 0300     Yan Peoples, PT, DPT

## 2017-08-08 ENCOUNTER — HOSPITAL ENCOUNTER (OUTPATIENT)
Dept: PHYSICAL THERAPY | Age: 62
Discharge: HOME | End: 2017-08-08
Payer: COMMERCIAL

## 2017-08-08 PROCEDURE — 97110 THERAPEUTIC EXERCISES: CPT

## 2017-08-08 PROCEDURE — 97140 MANUAL THERAPY 1/> REGIONS: CPT

## 2017-08-08 PROCEDURE — 97014 ELECTRIC STIMULATION THERAPY: CPT

## 2017-08-08 NOTE — PROGRESS NOTES
Boo Catherine  : [id number] [facility],[address]  [address] [place] [address].  [phone]   [phone]        OUTPATIENT PHYSICAL THERAPY:Daily Note 2017      ICD-10: Treatment Diagnosis: Pain in right knee (M25.561) , Stiffness of right knee, not elsewhere classified (M25.661)  Precautions/Allergies:   Morphine   Fall Risk Score: 0 (? 5 = High Risk)  MD Orders: Eval and Treat  MEDICAL/REFERRING DIAGNOSIS:  Total knee replacement status [Z96.659]   DATE OF ONSET:    REFERRING PHYSICIAN: Frankey Barrios,*  RETURN PHYSICIAN APPOINTMENT: October      INITIAL ASSESSMENT:  Ms. Bell Vick presents to therapy with pain and stiffness in the R knee s/p TKA secondary to OA. Pt is having swelling, decreased ROM and strength as well as difficulty with functional activities and ambulation due to the surgery. Pt would benefit from skilled PT for above deficits to return to prior level of function painfree. PROBLEM LIST (Impacting functional limitations):  1. Decreased Strength  2. Decreased ADL/Functional Activities  3. Decreased Balance  4. Increased Pain  5. Decreased Activity Tolerance  6. Decreased Flexibility/Joint Mobility INTERVENTIONS PLANNED:  1. Balance Exercise  2. Cold  3. Electrical Stimulation  4. Heat  5. Home Exercise Program (HEP)  6. Manual Therapy  7. Range of Motion (ROM)  8. Therapeutic Exercise/Strengthening  9. Whirlpool   TREATMENT PLAN:  Effective Dates: 17 TO 10-20-17. Frequency/Duration: 2-3 times a week for 12 weeks  GOALS: (Goals have been discussed and agreed upon with patient.)  Short-Term Functional Goals: Time Frame: 4 weeks   1. Ms. Bell Vick to be independent with HEP. 2. Pt able to tolerate ambulation and stairs without pain in the R knee and no antalgia. 3. Pt to increase AROM in the R knee to 0-110 ° to improve functional mobility. Discharge Goals: Time Frame: 12 weeks   1.  Pt able to tolerate all functional activities independent and without limitations and painfree. 2. Pt able to return to work without pain and no limitations. 3. Pt strength to improve to 4+/5 overall in the R knee to continue to improve with independent HEP. 4. Pt AROM in the R knee to 0-120 ° to return to functional activities without limitations. Rehabilitation Potential For Stated Goals: Excellent                HISTORY:   History of Present Injury/Illness (Reason for Referral):  Pt 58 y/o F with pain in the R knee s/p R TKA secondary to OA. Pt stated the surgery was done 1 month ago and she had home health once home for a few weeks. She is still very limited in flexion but is complete with extension. She is still having swelling and the incision site is not fully healed superficially. Pt is having difficulty with ambulation and stairs and performing sit to stands from lower chairs. She is sleeping ok but has a difficult time positioning in the bed due to the tenderness in the knee. Pt describes tightness in the medial portion of the knee but also had to have some extra work done due to a previous ACL repair surgery a while ago. Past Medical History/Comorbidities:   Ms. Devyn Bolivar  has a past medical history of Migraine and Osteopenia. She also has no past medical history of Adverse effect of anesthesia; Difficult intubation; Malignant hyperthermia due to anesthesia; Nausea & vomiting; or Pseudocholinesterase deficiency. Ms. Devyn Bolivar  has a past surgical history that includes orthopaedic (1996) and hysterectomy.   Social History/Living Environment:     Lives with    Prior Level of Function/Work/Activity:       Works as a massage therapist at [employer]   Previous Treatment Approaches:          Home health for 4 weeks   Personal Factors:          Profession:  Massage therapist   Current Medications:    Current Outpatient Prescriptions:     docusate sodium (STOOL SOFTENER) 100 mg capsule, Take 300 mg by mouth daily (after breakfast). , Disp: , Rfl:     aspirin delayed-release 325 mg tablet, Take 1 Tab by mouth every twelve (12) hours every twelve (12) hours. , Disp: 120 Tab, Rfl: 0    oxyCODONE IR (ROXICODONE) 10 mg tab immediate release tablet, Take 1 Tab by mouth every four (4) hours as needed. Max Daily Amount: 60 mg., Disp: 60 Tab, Rfl: 0    promethazine (PHENERGAN) 25 mg tablet, Take 1 Tab by mouth every six (6) hours as needed for Nausea., Disp: 50 Tab, Rfl: 0    levothyroxine (SYNTHROID) 125 mcg tablet, Take 125 mcg by mouth Daily (before breakfast). Indications: hypothyroidism, Disp: , Rfl:    Date Last Reviewed:  8/8/2017   EXAMINATION:   Observation/Orthostatic Postural Assessment:          Good   Palpation:          Tenderness over the medial portion and incision site   ROM:     L knee      Flexion: 130 °       Extension: 0 °    R knee      Flexion: 84 °       Extension: 0 °    Strength:     L knee       Flexion: 5/5      Extension:  5/5   R knee       Flexion: 3-      Extension: 3-   Special Tests:          N/a   Neurological Screen:        Sensation: some c/o of numbness due to the swelling and tightness   Functional Mobility:         Gait/Ambulation:  Independent with some antalgia         Transfers:  Independent with hands from lower surface         Stairs:  Using step to gait pattern and HRS  Balance:          Good    Body Structures Involved:  1. Joints  2. Muscles Body Functions Affected:  1. Sensory/Pain  2. Movement Related Activities and Participation Affected:  1. Mobility   CLINICAL PRESENTATION:   CLINICAL DECISION MAKING:   Outcome Measure: Tool Used: Lower Extremity Functional Scale (LEFS)  Score:  Initial: 45/80 Most Recent: X/80 (Date: -- )   Interpretation of Score: 20 questions each scored on a 5 point scale with 0 representing \"extreme difficulty or unable to perform\" and 4 representing \"no difficulty\". The lower the score, the greater the functional disability. 80/80 represents no disability. Minimal detectable change is 9 points. Score 80 79-63 62-48 47-32 31-16 15-1 0   Modifier CH CI CJ CK CL CM CN         Medical Necessity:   · Patient is expected to demonstrate progress in strength, range of motion and balance to increase independence with functional activities painfree . Reason for Services/Other Comments:  · Patient continues to require present interventions due to patient's inability to perform functional activities painfree. .   TREATMENT:   (In addition to Assessment/Re-Assessment sessions the following treatments were rendered)  THERAPEUTIC ACTIVITY: ( see below for minutes): Therapeutic activities per grid below to improve mobility and strength. Required minimal visual and verbal cues to promote dynamic balance in standing. THERAPEUTIC EXERCISE: (see below for minutes):  Exercises per grid below to improve mobility and strength. Required minimal verbal cues to promote proper body alignment and promote proper body mechanics. Progressed resistance, range and repetitions as indicated. MANUAL THERAPY: (see below for minutes): Joint mobilization and Soft tissue mobilization was utilized and necessary because of the patient's restricted joint motion and restricted motion of soft tissue. MODALITIES: (see below for minutes):      see chart below for details.       Date: 7-27-17 8-1-17 8-8-17  (Visit 4)     Modalities:  15 mins  15 mins  15 mins     IFC with ice   15 mins  15 mins  15 mins                     Therapeutic Exercise: 15 mins  25 mins  25 mins  30 mins     4 way SLR  3x10        HS stretch with strap  2x30SH 4x20SH  Repeat  Repeat     Heel slides with strap  x20   Repeat  Wall slides with 4# weight    Quad stretch with strap   4x30SH  Repeat  Repeat     Bike   1/2 revolutions 5 mins  Repeat  Repeat     Walking HS stretch    2L  Repeat     Walking march with holds   2L  2L  Repeat     slantboard   x60SH   Repeat     Bridging   2x15   Repeat     Proprioceptive Activities: Manual Therapy:  10 mins  10 mins  10 mins     STM with scar massage   5 mins  5 mins  Repeat     Patellar mobs   5 mins  5 mins  Repeat     Functional Activities:        Reverse walking on TM   1. 5mph 4 mins      Step up    6 in 2x10  Repeat     Resisted side stepping    Red band 2L  Repeat     Lateral step up with high knee     2x10     HEP: Pt was educated on scar massage and told to continue with 4 way SLR as well as patellar mobs and stretching. Added bridging and quad stretch with strap. Treatment/Session Assessment:  Pt is doing well except for flexion. Pt was given HEP and told to start doing wall slides with weights at home and focus on stretching at home. Continue with POC. · Pain/ Symptoms: Initial:  0/10 \"Im doing well except for my bend. My doctor said I might be going in for a manip. \"  Post Session:  2/10 \"It feels tight but ok.'  ·   Compliance with Program/Exercises: compliant all of the time. · Recommendations/Intent for next treatment session: \"Next visit will focus on advancements to more challenging activities\".   Total Treatment Duration:  PT Patient Time In/Time Out  Time In: 0200  Time Out: 0255     Teena Solis, PT, DPT

## 2017-08-10 ENCOUNTER — HOSPITAL ENCOUNTER (OUTPATIENT)
Dept: PHYSICAL THERAPY | Age: 62
Discharge: HOME | End: 2017-08-10
Payer: COMMERCIAL

## 2017-08-10 PROCEDURE — 97110 THERAPEUTIC EXERCISES: CPT

## 2017-08-10 PROCEDURE — 97014 ELECTRIC STIMULATION THERAPY: CPT

## 2017-08-10 NOTE — PROGRESS NOTES
Baljinder Alarcon  : 1955 [id number] [address].  [phone]   [phone]        OUTPATIENT PHYSICAL THERAPY:Daily Note 8/10/2017      ICD-10: Treatment Diagnosis: Pain in right knee (M25.561) , Stiffness of right knee, not elsewhere classified (M25.661)  Precautions/Allergies:   Morphine   Fall Risk Score: 0 (? 5 = High Risk)  MD Orders: Eval and Treat  MEDICAL/REFERRING DIAGNOSIS:  Total knee replacement status [Z96.659]   DATE OF ONSET:    REFERRING PHYSICIAN: Susan Suarez,*  RETURN PHYSICIAN APPOINTMENT: October      INITIAL ASSESSMENT:  Ms. Sebastien Jernigan presents to therapy with pain and stiffness in the R knee s/p TKA secondary to OA. Pt is having swelling, decreased ROM and strength as well as difficulty with functional activities and ambulation due to the surgery. Pt would benefit from skilled PT for above deficits to return to prior level of function painfree. PROBLEM LIST (Impacting functional limitations):  1. Decreased Strength  2. Decreased ADL/Functional Activities  3. Decreased Balance  4. Increased Pain  5. Decreased Activity Tolerance  6. Decreased Flexibility/Joint Mobility INTERVENTIONS PLANNED:  1. Balance Exercise  2. Cold  3. Electrical Stimulation  4. Heat  5. Home Exercise Program (HEP)  6. Manual Therapy  7. Range of Motion (ROM)  8. Therapeutic Exercise/Strengthening  9. Whirlpool   TREATMENT PLAN:  Effective Dates: 17 TO 10-20-17. Frequency/Duration: 2-3 times a week for 12 weeks  GOALS: (Goals have been discussed and agreed upon with patient.)  Short-Term Functional Goals: Time Frame: 4 weeks   1. Ms. Sebastien Jernigan to be independent with HEP. 2. Pt able to tolerate ambulation and stairs without pain in the R knee and no antalgia. 3. Pt to increase AROM in the R knee to 0-110 ° to improve functional mobility. Discharge Goals: Time Frame: 12 weeks   1.  Pt able to tolerate all functional activities independent and without limitations and painfree. 2. Pt able to return to work without pain and no limitations. 3. Pt strength to improve to 4+/5 overall in the R knee to continue to improve with independent HEP. 4. Pt AROM in the R knee to 0-120 ° to return to functional activities without limitations. Rehabilitation Potential For Stated Goals: Excellent                HISTORY:   History of Present Injury/Illness (Reason for Referral):  Pt 58 y/o F with pain in the R knee s/p R TKA secondary to OA. Pt stated the surgery was done 1 month ago and she had home health once home for a few weeks. She is still very limited in flexion but is complete with extension. She is still having swelling and the incision site is not fully healed superficially. Pt is having difficulty with ambulation and stairs and performing sit to stands from lower chairs. She is sleeping ok but has a difficult time positioning in the bed due to the tenderness in the knee. Pt describes tightness in the medial portion of the knee but also had to have some extra work done due to a previous ACL repair surgery a while ago. Past Medical History/Comorbidities:   Ms. Alexa Arreaga  has a past medical history of Migraine and Osteopenia. She also has no past medical history of Adverse effect of anesthesia; Difficult intubation; Malignant hyperthermia due to anesthesia; Nausea & vomiting; or Pseudocholinesterase deficiency. Ms. Alexa Arreaga  has a past surgical history that includes orthopaedic (1996) and hysterectomy.   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 , Disp: , Rfl:     aspirin delayed-release 325 mg tablet, Take 1 Tab by mouth every twelve (12) hours every twelve (12) hours. , Disp: 120 Tab, Rfl: 0    oxyCODONE IR (ROXICODONE) 10 mg tab immediate release tablet, Take 1 Tab by mouth every four (4) hours as needed. Max Daily Amount: 60 mg., Disp: 60 Tab, Rfl: 0    promethazine (PHENERGAN) 25 mg tablet, Take 1 Tab by mouth every six (6) hours as needed for Nausea., Disp: 50 Tab, Rfl: 0    levothyroxine (SYNTHROID) 125 mcg tablet, Take 125 mcg by mouth Daily (before breakfast). Indications: hypothyroidism, Disp: , Rfl:    Date Last Reviewed:  8/10/2017   EXAMINATION:   Observation/Orthostatic Postural Assessment:          Good   Palpation:          Tenderness over the medial portion and incision site   ROM:     L knee      Flexion: 130 °       Extension: 0 °    R knee      Flexion: 84 °       Extension: 0 °    Strength:     L knee       Flexion: 5/5      Extension:  5/5   R knee       Flexion: 3-      Extension: 3-   Special Tests:          N/a   Neurological Screen:        Sensation: some c/o of numbness due to the swelling and tightness   Functional Mobility:         Gait/Ambulation:  Independent with some antalgia         Transfers:  Independent with hands from lower surface         Stairs:  Using step to gait pattern and HRS  Balance:          Good    Body Structures Involved:  1. Joints  2. Muscles Body Functions Affected:  1. Sensory/Pain  2. Movement Related Activities and Participation Affected:  1. Mobility   CLINICAL PRESENTATION:   CLINICAL DECISION MAKING:   Outcome Measure: Tool Used: Lower Extremity Functional Scale (LEFS)  Score:  Initial: 45/80 Most Recent: X/80 (Date: -- )   Interpretation of Score: 20 questions each scored on a 5 point scale with 0 representing \"extreme difficulty or unable to perform\" and 4 representing \"no difficulty\". The lower the score, the greater the functional disability.  80/80 represents no disability. Minimal detectable change is 9 points. Score 80 79-63 62-48 47-32 31-16 15-1 0   Modifier CH CI CJ CK CL CM CN         Medical Necessity:   · Patient is expected to demonstrate progress in strength, range of motion and balance to increase independence with functional activities painfree . Reason for Services/Other Comments:  · Patient continues to require present interventions due to patient's inability to perform functional activities painfree. .   TREATMENT:   (In addition to Assessment/Re-Assessment sessions the following treatments were rendered)  THERAPEUTIC ACTIVITY: ( see below for minutes): Therapeutic activities per grid below to improve mobility and strength. Required minimal visual and verbal cues to promote dynamic balance in standing. THERAPEUTIC EXERCISE: (see below for minutes):  Exercises per grid below to improve mobility and strength. Required minimal verbal cues to promote proper body alignment and promote proper body mechanics. Progressed resistance, range and repetitions as indicated. MANUAL THERAPY: (see below for minutes): Joint mobilization and Soft tissue mobilization was utilized and necessary because of the patient's restricted joint motion and restricted motion of soft tissue. MODALITIES: (see below for minutes):      see chart below for details.       Date: 7-27-17 8-1-17 8-8-17  (Visit 4)  8-10-17  (Visit 5)    Modalities:  15 mins  15 mins  15 mins  15 mins    IFC with ice   15 mins  15 mins  15 mins  15 mins                    Therapeutic Exercise: 15 mins  25 mins  25 mins  30 mins  45 mins    4 way SLR  3x10        HS stretch with strap  2x30SH 4x20SH  Repeat  Repeat  Repeat    Heel slides with strap  x20   Repeat  Wall slides with 4# weight Repeat 5#    Quad stretch with strap   4x30SH  Repeat  Repeat  Repeat    Bike   1/2 revolutions 5 mins  Repeat  Repeat  Repeat    Walking HS stretch    2L  Repeat  Repeat    Walking march with holds   2L  2L  Repeat Repeat    slantboard   x60SH   Repeat  Repeat    Bridging   2x15   Repeat  Repeat    Proprioceptive Activities:                                Manual Therapy:  10 mins  10 mins  10 mins     STM with scar massage   5 mins  5 mins  Repeat     Patellar mobs   5 mins  5 mins  Repeat     Functional Activities:        Reverse walking on TM   1. 5mph 4 mins      Heel raises      2x10    Squats      2x10    Step up    6 in 2x10  Repeat  6 in 2x10 with high knee    Resisted side stepping    Red band 2L  Repeat  Repeat    Lateral step up with high knee     2x10  Repeat    HEP: Pt was educated on scar massage and told to continue with 4 way SLR as well as patellar mobs and stretching. Added bridging and quad stretch with strap. Treatment/Session Assessment:  Pt flexion is increased by 4 ° from last session. Pt was told to continue with the massage and stretching at home. · Pain/ Symptoms: Initial:  0/10 \"I did a lot of massage to my knee and it seems to have helped. \"  Post Session:  2/10 \"It feels tight but ok.'  ·   Compliance with Program/Exercises: compliant all of the time. · Recommendations/Intent for next treatment session: \"Next visit will focus on advancements to more challenging activities\".   Total Treatment Duration:  PT Patient Time In/Time Out  Time In: 0200  Time Out: 0300     Veronica Kim, PT, DPT

## 2017-08-15 ENCOUNTER — HOSPITAL ENCOUNTER (OUTPATIENT)
Dept: PHYSICAL THERAPY | Age: 62
Discharge: HOME | End: 2017-08-15
Payer: COMMERCIAL

## 2017-08-16 ENCOUNTER — HOSPITAL ENCOUNTER (OUTPATIENT)
Dept: PHYSICAL THERAPY | Age: 62
Discharge: HOME | End: 2017-08-16
Payer: COMMERCIAL

## 2017-08-16 PROCEDURE — 97110 THERAPEUTIC EXERCISES: CPT

## 2017-08-16 PROCEDURE — 97140 MANUAL THERAPY 1/> REGIONS: CPT

## 2017-08-16 PROCEDURE — 97014 ELECTRIC STIMULATION THERAPY: CPT

## 2017-08-16 NOTE — PROGRESS NOTES
Princess Morales  : 1955 [id number] [facility],[address]  [address] [place] [address].  [phone]   [phone]        OUTPATIENT PHYSICAL THERAPY:Daily Note 2017      ICD-10: Treatment Diagnosis: Pain in right knee (M25.561) , Stiffness of right knee, not elsewhere classified (M25.661)  Precautions/Allergies:   Morphine   Fall Risk Score: 0 (? 5 = High Risk)  MD Orders: Eval and Treat  MEDICAL/REFERRING DIAGNOSIS:  Total knee replacement status [Z96.659]   DATE OF ONSET:    REFERRING PHYSICIAN: Valeri Allan,*  RETURN PHYSICIAN APPOINTMENT: October      INITIAL ASSESSMENT:  Ms. Isra Quiroz presents to therapy with pain and stiffness in the R knee s/p TKA secondary to OA. Pt is having swelling, decreased ROM and strength as well as difficulty with functional activities and ambulation due to the surgery. Pt would benefit from skilled PT for above deficits to return to prior level of function painfree. PROBLEM LIST (Impacting functional limitations):  1. Decreased Strength  2. Decreased ADL/Functional Activities  3. Decreased Balance  4. Increased Pain  5. Decreased Activity Tolerance  6. Decreased Flexibility/Joint Mobility INTERVENTIONS PLANNED:  1. Balance Exercise  2. Cold  3. Electrical Stimulation  4. Heat  5. Home Exercise Program (HEP)  6. Manual Therapy  7. Range of Motion (ROM)  8. Therapeutic Exercise/Strengthening  9. Whirlpool   TREATMENT PLAN:  Effective Dates: 17 TO 10-20-17. Frequency/Duration: 2-3 times a week for 12 weeks  GOALS: (Goals have been discussed and agreed upon with patient.)  Short-Term Functional Goals: Time Frame: 4 weeks   1. Ms. Isra Quiroz to be independent with HEP. 2. Pt able to tolerate ambulation and stairs without pain in the R knee and no antalgia. 3. Pt to increase AROM in the R knee to 0-110 ° to improve functional mobility. Discharge Goals: Time Frame: 12 weeks   1.  Pt able to tolerate all functional activities independent and without limitations and painfree. 2. Pt able to return to work without pain and no limitations. 3. Pt strength to improve to 4+/5 overall in the R knee to continue to improve with independent HEP. 4. Pt AROM in the R knee to 0-120 ° to return to functional activities without limitations. Rehabilitation Potential For Stated Goals: Excellent                HISTORY:   History of Present Injury/Illness (Reason for Referral):  Pt 58 y/o F with pain in the R knee s/p R TKA secondary to OA. Pt stated the surgery was done 1 month ago and she had home health once home for a few weeks. She is still very limited in flexion but is complete with extension. She is still having swelling and the incision site is not fully healed superficially. Pt is having difficulty with ambulation and stairs and performing sit to stands from lower chairs. She is sleeping ok but has a difficult time positioning in the bed due to the tenderness in the knee. Pt describes tightness in the medial portion of the knee but also had to have some extra work done due to a previous ACL repair surgery a while ago. Past Medical History/Comorbidities:   Ms. Portillo Cleaning  has a past medical history of Migraine and Osteopenia. She also has no past medical history of Adverse effect of anesthesia; Difficult intubation; Malignant hyperthermia due to anesthesia; Nausea & vomiting; or Pseudocholinesterase deficiency. Ms. Portillo Cleaning  has a past surgical history that includes orthopaedic (1996) and hysterectomy.   Social History/Living Environment:     Lives with    Prior Level of Function/Work/Activity:       Works as a massage therapist at [employer]   Previous Treatment Approaches:          Home health for 4 weeks   Personal Factors:          Profession:  Massage therapist   Current Medications:    Current Outpatient Prescriptions:     docusate sodium (STOOL SOFTENER) 100 mg capsule, Take 300 mg by mouth daily (after breakfast). , Disp: , Rfl:     aspirin delayed-release 325 mg tablet, Take 1 Tab by mouth every twelve (12) hours every twelve (12) hours. , Disp: 120 Tab, Rfl: 0    oxyCODONE IR (ROXICODONE) 10 mg tab immediate release tablet, Take 1 Tab by mouth every four (4) hours as needed. Max Daily Amount: 60 mg., Disp: 60 Tab, Rfl: 0    promethazine (PHENERGAN) 25 mg tablet, Take 1 Tab by mouth every six (6) hours as needed for Nausea., Disp: 50 Tab, Rfl: 0    levothyroxine (SYNTHROID) 125 mcg tablet, Take 125 mcg by mouth Daily (before breakfast). Indications: hypothyroidism, Disp: , Rfl:    Date Last Reviewed:  8/16/2017   EXAMINATION:   Observation/Orthostatic Postural Assessment:          Good   Palpation:          Tenderness over the medial portion and incision site   ROM:     L knee      Flexion: 130 °       Extension: 0 °    R knee      Flexion: 84 °       Extension: 0 °    Strength:     L knee       Flexion: 5/5      Extension:  5/5   R knee       Flexion: 3-      Extension: 3-   Special Tests:          N/a   Neurological Screen:        Sensation: some c/o of numbness due to the swelling and tightness   Functional Mobility:         Gait/Ambulation:  Independent with some antalgia         Transfers:  Independent with hands from lower surface         Stairs:  Using step to gait pattern and HRS  Balance:          Good    Body Structures Involved:  1. Joints  2. Muscles Body Functions Affected:  1. Sensory/Pain  2. Movement Related Activities and Participation Affected:  1. Mobility   CLINICAL PRESENTATION:   CLINICAL DECISION MAKING:   Outcome Measure: Tool Used: Lower Extremity Functional Scale (LEFS)  Score:  Initial: 45/80 Most Recent: X/80 (Date: -- )   Interpretation of Score: 20 questions each scored on a 5 point scale with 0 representing \"extreme difficulty or unable to perform\" and 4 representing \"no difficulty\". The lower the score, the greater the functional disability.  80/80 represents no disability. Minimal detectable change is 9 points. Score 80 79-63 62-48 47-32 31-16 15-1 0   Modifier CH CI CJ CK CL CM CN         Medical Necessity:   · Patient is expected to demonstrate progress in strength, range of motion and balance to increase independence with functional activities painfree . Reason for Services/Other Comments:  · Patient continues to require present interventions due to patient's inability to perform functional activities painfree. .   TREATMENT:   (In addition to Assessment/Re-Assessment sessions the following treatments were rendered)  THERAPEUTIC ACTIVITY: ( see below for minutes): Therapeutic activities per grid below to improve mobility and strength. Required minimal visual and verbal cues to promote dynamic balance in standing. THERAPEUTIC EXERCISE: (see below for minutes):  Exercises per grid below to improve mobility and strength. Required minimal verbal cues to promote proper body alignment and promote proper body mechanics. Progressed resistance, range and repetitions as indicated. MANUAL THERAPY: (see below for minutes): Joint mobilization and Soft tissue mobilization was utilized and necessary because of the patient's restricted joint motion and restricted motion of soft tissue. MODALITIES: (see below for minutes):      see chart below for details.       Date: 7-27-17 8-1-17 8-8-17  (Visit 4)  8-10-17  (Visit 5)  8-16-17  (Visit 6)    Modalities:  15 mins  15 mins  15 mins  15 mins  15 mins    IFC with ice   15 mins  15 mins  15 mins  15 mins  15 mins                      Therapeutic Exercise: 15 mins  25 mins  25 mins  30 mins  45 mins  30 mins    4 way SLR  3x10         HS stretch with strap  2x30SH 4x20SH  Repeat  Repeat  Repeat  HS stretch on table    Heel slides with strap  x20   Repeat  Wall slides with 4# weight Repeat 5#  Using flexion rope 5 mins    Quad stretch with strap   4x30SH  Repeat  Repeat  Repeat     Bike   1/2 revolutions 5 mins  Repeat  Repeat Repeat  6 mins    Walking HS stretch    2L  Repeat  Repeat     Walking march with holds   2L  2L  Repeat  Repeat     slantboard   x60SH   Repeat  Repeat  Repeat    Bridging   2x15   Repeat  Repeat  With green tband 3x10    Proprioceptive Activities:                                    Manual Therapy:  10 mins  10 mins  10 mins   10 mins    STM with scar massage   5 mins  5 mins  Repeat      Graded mobilization to increase flexion R knee       5 mins    Patellar mobs   5 mins  5 mins  Repeat   5 mins    Functional Activities:         Reverse walking on TM   1. 5mph 4 mins       Heel raises      2x10  3x10    Squats      2x10  2x15    Step up    6 in 2x10  Repeat  6 in 2x10 with high knee     Resisted side stepping    Red band 2L  Repeat  Repeat  2L red band    Eccentric heel taps       4in 3x10    Lateral step up with high knee     2x10  Repeat     HEP: Pt was educated on scar massage and told to continue with 4 way SLR as well as patellar mobs and stretching. Added bridging and quad stretch with strap. Treatment/Session Assessment:  Pt was a little down today because of the limitations of flexion she is still dealing with as well as swelling. Pt is back to work and was explained she may be having some increase in swelling and she seems to be doing HEP but is having trouble pushing herself past 100 ° . Pt was issued a knee pulley and was told to work toward flexion tonight and will return tomorrow for regular scheduled appt. Continue with POC. · Pain/ Symptoms: Initial:  5/10 \"I have been hurting today. \"  Post Session:  2/10 \"It feels tight but ok.'  ·   Compliance with Program/Exercises: compliant all of the time. · Recommendations/Intent for next treatment session: \"Next visit will focus on advancements to more challenging activities\".   Total Treatment Duration:  PT Patient Time In/Time Out  Time In: 0245  Time Out: [address], PT, DPT

## 2017-08-17 ENCOUNTER — HOSPITAL ENCOUNTER (OUTPATIENT)
Dept: PHYSICAL THERAPY | Age: 62
Discharge: HOME | End: 2017-08-17
Payer: COMMERCIAL

## 2017-08-17 PROCEDURE — 97110 THERAPEUTIC EXERCISES: CPT

## 2017-08-17 PROCEDURE — 97140 MANUAL THERAPY 1/> REGIONS: CPT

## 2017-08-17 PROCEDURE — 97014 ELECTRIC STIMULATION THERAPY: CPT

## 2017-08-17 NOTE — PROGRESS NOTES
Kristi Rooney  : [id number] [facility],[address]  [address] [place] [address]  [phone]   [phone]        OUTPATIENT PHYSICAL THERAPY:Daily Note 2017      ICD-10: Treatment Diagnosis: Pain in right knee (M25.561) , Stiffness of right knee, not elsewhere classified (M25.661)  Precautions/Allergies:   Morphine   Fall Risk Score: 0 (? 5 = High Risk)  MD Orders: Eval and Treat  MEDICAL/REFERRING DIAGNOSIS:  Total knee replacement status [Z96.659]   DATE OF ONSET:    REFERRING PHYSICIAN: Corona Davis,*  RETURN PHYSICIAN APPOINTMENT: October      INITIAL ASSESSMENT:  Ms. Valery Gaming presents to therapy with pain and stiffness in the R knee s/p TKA secondary to OA. Pt is having swelling, decreased ROM and strength as well as difficulty with functional activities and ambulation due to the surgery. Pt would benefit from skilled PT for above deficits to return to prior level of function painfree. PROBLEM LIST (Impacting functional limitations):  1. Decreased Strength  2. Decreased ADL/Functional Activities  3. Decreased Balance  4. Increased Pain  5. Decreased Activity Tolerance  6. Decreased Flexibility/Joint Mobility INTERVENTIONS PLANNED:  1. Balance Exercise  2. Cold  3. Electrical Stimulation  4. Heat  5. Home Exercise Program (HEP)  6. Manual Therapy  7. Range of Motion (ROM)  8. Therapeutic Exercise/Strengthening  9. Whirlpool   TREATMENT PLAN:  Effective Dates: 17 TO 10-20-17. Frequency/Duration: 2-3 times a week for 12 weeks  GOALS: (Goals have been discussed and agreed upon with patient.)  Short-Term Functional Goals: Time Frame: 4 weeks   1. Ms. Valery Gaming to be independent with HEP. 2. Pt able to tolerate ambulation and stairs without pain in the R knee and no antalgia. 3. Pt to increase AROM in the R knee to 0-110 ° to improve functional mobility. Discharge Goals: Time Frame: 12 weeks   1.  Pt able to tolerate all functional activities independent and without limitations and painfree. 2. Pt able to return to work without pain and no limitations. 3. Pt strength to improve to 4+/5 overall in the R knee to continue to improve with independent HEP. 4. Pt AROM in the R knee to 0-120 ° to return to functional activities without limitations. Rehabilitation Potential For Stated Goals: Excellent                HISTORY:   History of Present Injury/Illness (Reason for Referral):  Pt 56 y/o F with pain in the R knee s/p R TKA secondary to OA. Pt stated the surgery was done 1 month ago and she had home health once home for a few weeks. She is still very limited in flexion but is complete with extension. She is still having swelling and the incision site is not fully healed superficially. Pt is having difficulty with ambulation and stairs and performing sit to stands from lower chairs. She is sleeping ok but has a difficult time positioning in the bed due to the tenderness in the knee. Pt describes tightness in the medial portion of the knee but also had to have some extra work done due to a previous ACL repair surgery a while ago. Past Medical History/Comorbidities:   Ms. Madie Morelos  has a past medical history of Migraine and Osteopenia. She also has no past medical history of Adverse effect of anesthesia; Difficult intubation; Malignant hyperthermia due to anesthesia; Nausea & vomiting; or Pseudocholinesterase deficiency. Ms. Madie Morelos  has a past surgical history that includes orthopaedic (1996) and hysterectomy.   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 , Disp: , Rfl:     aspirin delayed-release 325 mg tablet, Take 1 Tab by mouth every twelve (12) hours every twelve (12) hours. , Disp: 120 Tab, Rfl: 0    oxyCODONE IR (ROXICODONE) 10 mg tab immediate release tablet, Take 1 Tab by mouth every four (4) hours as needed. Max Daily Amount: 60 mg., Disp: 60 Tab, Rfl: 0    promethazine (PHENERGAN) 25 mg tablet, Take 1 Tab by mouth every six (6) hours as needed for Nausea., Disp: 50 Tab, Rfl: 0    levothyroxine (SYNTHROID) 125 mcg tablet, Take 125 mcg by mouth Daily (before breakfast). Indications: hypothyroidism, Disp: , Rfl:    Date Last Reviewed:  8/17/2017   EXAMINATION:   Observation/Orthostatic Postural Assessment:          Good   Palpation:          Tenderness over the medial portion and incision site   ROM:     L knee      Flexion: 130 °       Extension: 0 °    R knee      Flexion: 84 °       Extension: 0 °    Strength:     L knee       Flexion: 5/5      Extension:  5/5   R knee       Flexion: 3-      Extension: 3-   Special Tests:          N/a   Neurological Screen:        Sensation: some c/o of numbness due to the swelling and tightness   Functional Mobility:         Gait/Ambulation:  Independent with some antalgia         Transfers:  Independent with hands from lower surface         Stairs:  Using step to gait pattern and HRS  Balance:          Good    Body Structures Involved:  1. Joints  2. Muscles Body Functions Affected:  1. Sensory/Pain  2. Movement Related Activities and Participation Affected:  1. Mobility   CLINICAL PRESENTATION:   CLINICAL DECISION MAKING:   Outcome Measure: Tool Used: Lower Extremity Functional Scale (LEFS)  Score:  Initial: 45/80 Most Recent: X/80 (Date: -- )   Interpretation of Score: 20 questions each scored on a 5 point scale with 0 representing \"extreme difficulty or unable to perform\" and 4 representing \"no difficulty\". The lower the score, the greater the functional disability.  80/80 represents no disability. Minimal detectable change is 9 points. Score 80 79-63 62-48 47-32 31-16 15-1 0   Modifier CH CI CJ CK CL CM CN         Medical Necessity:   · Patient is expected to demonstrate progress in strength, range of motion and balance to increase independence with functional activities painfree . Reason for Services/Other Comments:  · Patient continues to require present interventions due to patient's inability to perform functional activities painfree. .   TREATMENT:   (In addition to Assessment/Re-Assessment sessions the following treatments were rendered)  THERAPEUTIC ACTIVITY: ( see below for minutes): Therapeutic activities per grid below to improve mobility and strength. Required minimal visual and verbal cues to promote dynamic balance in standing. THERAPEUTIC EXERCISE: (see below for minutes):  Exercises per grid below to improve mobility and strength. Required minimal verbal cues to promote proper body alignment and promote proper body mechanics. Progressed resistance, range and repetitions as indicated. MANUAL THERAPY: (see below for minutes): Joint mobilization and Soft tissue mobilization was utilized and necessary because of the patient's restricted joint motion and restricted motion of soft tissue. MODALITIES: (see below for minutes):      see chart below for details.       Date: 7-27-17 8-1-17 8-8-17  (Visit 4)  8-10-17  (Visit 5)  8-16-17  (Visit 6)  8-17-17  (Visit 7)    Modalities:  15 mins  15 mins  15 mins  15 mins  15 mins  15 mins    IFC with ice   15 mins  15 mins  15 mins  15 mins  15 mins  15 mins                        Therapeutic Exercise: 15 mins  25 mins  25 mins  30 mins  45 mins  30 mins  30 mins    4 way SLR  3x10       SLR with ER and Qs   HS stretch with strap  2x30SH 4x20SH  Repeat  Repeat  Repeat  HS stretch on table  Repeat    Heel slides with strap  x20   Repeat  Wall slides with 4# weight Repeat 5#  Using flexion rope 5 mins  Manual    Quad stretch with strap 4x30SH  Repeat  Repeat  Repeat   Repeat    Bike   1/2 revolutions 5 mins  Repeat  Repeat  Repeat  6 mins  6 mins    Walking HS stretch    2L  Repeat  Repeat      Walking march with holds   2L  2L  Repeat  Repeat   With 8# 2L    slantboard   x60SH   Repeat  Repeat  Repeat  Repeat    Bridging   2x15   Repeat  Repeat  With green tband 3x10  Repeat    Proprioceptive Activities:                                        Manual Therapy:  10 mins  10 mins  10 mins   10 mins  10 mins    STM with scar massage   5 mins  5 mins  Repeat       Graded mobilization to increase flexion R knee       5 mins  5 mins    Patellar mobs   5 mins  5 mins  Repeat   5 mins  5 mins    Functional Activities:          Reverse walking on TM   1. 5mph 4 mins        Heel raises      2x10  3x10  Repeat    Squats      2x10  2x15     Step up    6 in 2x10  Repeat  6 in 2x10 with high knee      Resisted side stepping    Red band 2L  Repeat  Repeat  2L red band  Side step with squat using 8# ball    Eccentric heel taps       4in 3x10     RDL        To table 2x10    Lateral step up with high knee     2x10  Repeat      HEP: Pt was educated on scar massage and told to continue with 4 way SLR as well as patellar mobs and stretching. Added bridging and quad stretch with strap. Treatment/Session Assessment:  Pt is still very limited in flexion and has a medium to hard end feel with stretching. Pt is going out of town for a week and will return to therapy once returning to town. Continue with POC. · Pain/ Symptoms: Initial: \"Im about the same. Im stiff. \" 5/10  Post Session:  2/10 \"It feels tight but ok.'  ·   Compliance with Program/Exercises: compliant all of the time. · Recommendations/Intent for next treatment session: \"Next visit will focus on advancements to more challenging activities\".   Total Treatment Duration:  PT Patient Time In/Time Out  Time In: 0200  Time Out: 0300     Torito Brown, PT, DPT

## 2017-08-29 ENCOUNTER — HOSPITAL ENCOUNTER (OUTPATIENT)
Dept: PHYSICAL THERAPY | Age: 62
Discharge: HOME | End: 2017-08-29
Payer: COMMERCIAL

## 2017-08-29 NOTE — PROGRESS NOTES
Umer Moody  : 1955 [id number] [facility]  [address] [place] [address].  [phone]   [phone]        OUTPATIENT PHYSICAL [facility] 2017      ICD-10: Treatment Diagnosis: Pain in right knee (M25.561) , Stiffness of right knee, not elsewhere classified (M25.661)  Precautions/Allergies:   Morphine   Fall Risk Score: 0 (? 5 = High Risk)  MD Orders: Eval and Treat  MEDICAL/REFERRING DIAGNOSIS:  Total knee replacement status [Z96.659]   DATE OF ONSET:    REFERRING PHYSICIAN: Alyse Prescott,*  RETURN PHYSICIAN APPOINTMENT: October      INITIAL ASSESSMENT:  Ms. Livia Gonzalez presents to therapy with pain and stiffness in the R knee s/p TKA secondary to OA. Pt is having swelling, decreased ROM and strength as well as difficulty with functional activities and ambulation due to the surgery. Pt would benefit from skilled PT for above deficits to return to prior level of function painfree. PROBLEM LIST (Impacting functional limitations):  1. Decreased Strength  2. Decreased ADL/Functional Activities  3. Decreased Balance  4. Increased Pain  5. Decreased Activity Tolerance  6. Decreased Flexibility/Joint Mobility INTERVENTIONS PLANNED:  1. Balance Exercise  2. Cold  3. Electrical Stimulation  4. Heat  5. Home Exercise Program (HEP)  6. Manual Therapy  7. Range of Motion (ROM)  8. Therapeutic Exercise/Strengthening  9. Whirlpool   TREATMENT PLAN:  Effective Dates: 17 TO 10-20-17. Frequency/Duration: 2-3 times a week for 12 weeks  GOALS: (Goals have been discussed and agreed upon with patient.)  Short-Term Functional Goals: Time Frame: 4 weeks   1. Ms. Mota to be independent with HEP. (MET)  2. Pt able to tolerate ambulation and stairs without pain in the R knee and no antalgia. (MET)  3. Pt to increase AROM in the R knee to 0-110 ° to improve functional mobility. (MET)  Discharge Goals: Time Frame: 12 weeks   1.  Pt able to tolerate all functional activities independent and without limitations and painfree. (Progressing)  2. Pt able to return to work without pain and no limitations. (Progressing)   3. Pt strength to improve to 4+/5 overall in the R knee to continue to improve with independent HEP. (PRogressing)   4. Pt AROM in the R knee to 0-120 ° to return to functional activities without limitations. (Progressing)   Rehabilitation Potential For Stated Goals: Excellent                HISTORY:   History of Present Injury/Illness (Reason for Referral):  Pt 56 y/o F with pain in the R knee s/p R TKA secondary to OA. Pt stated the surgery was done 1 month ago and she had home health once home for a few weeks. She is still very limited in flexion but is complete with extension. She is still having swelling and the incision site is not fully healed superficially. Pt is having difficulty with ambulation and stairs and performing sit to stands from lower chairs. She is sleeping ok but has a difficult time positioning in the bed due to the tenderness in the knee. Pt describes tightness in the medial portion of the knee but also had to have some extra work done due to a previous ACL repair surgery a while ago. Past Medical History/Comorbidities:   Ms. Rob Landry  has a past medical history of Migraine and Osteopenia. She also has no past medical history of Adverse effect of anesthesia; Difficult intubation; Malignant hyperthermia due to anesthesia; Nausea & vomiting; or Pseudocholinesterase deficiency. Ms. Rob Landry  has a past surgical history that includes orthopaedic (1996) and hysterectomy.   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 , Disp: , Rfl:     aspirin delayed-release 325 mg tablet, Take 1 Tab by mouth every twelve (12) hours every twelve (12) hours. , Disp: 120 Tab, Rfl: 0    oxyCODONE IR (ROXICODONE) 10 mg tab immediate release tablet, Take 1 Tab by mouth every four (4) hours as needed. Max Daily Amount: 60 mg., Disp: 60 Tab, Rfl: 0    promethazine (PHENERGAN) 25 mg tablet, Take 1 Tab by mouth every six (6) hours as needed for Nausea., Disp: 50 Tab, Rfl: 0    levothyroxine (SYNTHROID) 125 mcg tablet, Take 125 mcg by mouth Daily (before breakfast). Indications: hypothyroidism, Disp: , Rfl:    Date Last Reviewed:  8/29/2017   EXAMINATION:   Observation/Orthostatic Postural Assessment:          Good   Palpation:          Tenderness over the medial portion and incision site   ROM:     L knee      Flexion: 130 °       Extension: 0 °    R knee      Flexion: 84 °       Extension: 0 °    Strength:     L knee       Flexion: 5/5      Extension:  5/5   R knee       Flexion: 3-      Extension: 3-   Special Tests:          N/a   Neurological Screen:        Sensation: some c/o of numbness due to the swelling and tightness   Functional Mobility:         Gait/Ambulation:  Independent with some antalgia         Transfers:  Independent with hands from lower surface         Stairs:  Using step to gait pattern and HRS  Balance:          Good    Body Structures Involved:  1. Joints  2. Muscles Body Functions Affected:  1. Sensory/Pain  2. Movement Related Activities and Participation Affected:  1. Mobility   CLINICAL PRESENTATION:   CLINICAL DECISION MAKING:   Outcome Measure: Tool Used: Lower Extremity Functional Scale (LEFS)  Score:  Initial: 45/80 Most Recent: X/80 (Date: -- )   Interpretation of Score: 20 questions each scored on a 5 point scale with 0 representing \"extreme difficulty or unable to perform\" and 4 representing \"no difficulty\".   The lower the score, the greater the functional disability. 80/80 represents no disability. Minimal detectable change is 9 points. Score 80 79-63 62-48 47-32 31-16 15-1 0   Modifier CH CI CJ CK CL CM CN         Medical Necessity:   · Patient is expected to demonstrate progress in strength, range of motion and balance to increase independence with functional activities painfree . Reason for Services/Other Comments:  · Patient continues to require present interventions due to patient's inability to perform functional activities painfree. .   TREATMENT:   (In addition to Assessment/Re-Assessment sessions the following treatments were rendered)  THERAPEUTIC ACTIVITY: ( see below for minutes): Therapeutic activities per grid below to improve mobility and strength. Required minimal visual and verbal cues to promote dynamic balance in standing. THERAPEUTIC EXERCISE: (see below for minutes):  Exercises per grid below to improve mobility and strength. Required minimal verbal cues to promote proper body alignment and promote proper body mechanics. Progressed resistance, range and repetitions as indicated. MANUAL THERAPY: (see below for minutes): Joint mobilization and Soft tissue mobilization was utilized and necessary because of the patient's restricted joint motion and restricted motion of soft tissue. MODALITIES: (see below for minutes):      see chart below for details.       Date: 7-27-17 8-1-17 8-8-17  (Visit 4)  8-10-17  (Visit 5)  8-16-17  (Visit 6)  8-17-17  (Visit 7)    Modalities:  15 mins  15 mins  15 mins  15 mins  15 mins  15 mins    IFC with ice   15 mins  15 mins  15 mins  15 mins  15 mins  15 mins                        Therapeutic Exercise: 15 mins  25 mins  25 mins  30 mins  45 mins  30 mins  30 mins    4 way SLR  3x10       SLR with ER and Qs   HS stretch with strap  2x30SH 4x20SH  Repeat  Repeat  Repeat  HS stretch on table  Repeat    Heel slides with strap  x20   Repeat  Wall slides with 4# weight Repeat 5#  Using flexion rope 5 mins  Manual    Quad stretch with strap   4x30SH  Repeat  Repeat  Repeat   Repeat    Bike   1/2 revolutions 5 mins  Repeat  Repeat  Repeat  6 mins  6 mins    Walking HS stretch    2L  Repeat  Repeat      Walking march with holds   2L  2L  Repeat  Repeat   With 8# 2L    slantboard   x60SH   Repeat  Repeat  Repeat  Repeat    Bridging   2x15   Repeat  Repeat  With green tband 3x10  Repeat    Proprioceptive Activities:                                        Manual Therapy:  10 mins  10 mins  10 mins   10 mins  10 mins    STM with scar massage   5 mins  5 mins  Repeat       Graded mobilization to increase flexion R knee       5 mins  5 mins    Patellar mobs   5 mins  5 mins  Repeat   5 mins  5 mins    Functional Activities:          Reverse walking on TM   1. 5mph 4 mins        Heel raises      2x10  3x10  Repeat    Squats      2x10  2x15     Step up    6 in 2x10  Repeat  6 in 2x10 with high knee      Resisted side stepping    Red band 2L  Repeat  Repeat  2L red band  Side step with squat using 8# ball    Eccentric heel taps       4in 3x10     RDL        To table 2x10    Lateral step up with high knee     2x10  Repeat      HEP: Pt was educated on scar massage and told to continue with 4 way SLR as well as patellar mobs and stretching. Added bridging and quad stretch with strap. Treatment/Session Assessment:  Pt will be going in for a manipulation in 2 weeks and will be going to [facility] to see [place] following manipulation. Pt is discharged.       Naren Avery, PT, DPT

## 2017-08-29 NOTE — PROGRESS NOTES
Pt left for vacation after treatment on 8-17 for 10 days. Pt went to see doctor once returning and she is having a manipulation. Pt had a hard limit for therapy and will be going to [facility] following surgery due to doctor preference for therapy at that facility.

## 2017-08-31 ENCOUNTER — APPOINTMENT (OUTPATIENT)
Dept: PHYSICAL THERAPY | Age: 62
End: 2017-08-31
Payer: COMMERCIAL

## 2017-09-15 NOTE — H&P
[facility]  History and Physical Exam    Patient ID:  Chick Flow  [id number]    68 y.o.  1955    Today: September 15, 2017    Vitals Signs: Reviewed as noted in medical record. Allergies: Allergies   Allergen Reactions    Morphine Itching       CC: Right knee stiffness and pain    HPI:  Pt complains of right knee stiffness, pain and with difficulty ambulating. Relevant PMH:   Past Medical History:   Diagnosis Date    Migraine     Osteopenia        Objective:                    HEENT: NC/AT                   Lungs:  clear                   Heart:   rrr                   Abdomen: soft                   Extremities:  Pain with rom of the right knee    Radiographs: reveal TKA  Assessment: Joint stiffness of knee, right [M25.661]    Plan:  Proceed with scheduled Procedure(s) (LRB):  MANIPULATION RIGHT KNEE (Right) . The patient has failed conservative treatment including NSAIDS, and injections. Due to the amount of pain the patient is experiencing we will proceed with scheduled procedure.       Signed By: SONDRA Oneal  September 15, 2017  T

## 2017-09-18 ENCOUNTER — HOSPITAL ENCOUNTER (OUTPATIENT)
Dept: SURGERY | Age: 62
Discharge: HOME | End: 2017-09-18

## 2017-09-18 ENCOUNTER — ANESTHESIA EVENT (OUTPATIENT)
Dept: SURGERY | Age: 62
End: 2017-09-18
Payer: COMMERCIAL

## 2017-09-18 VITALS — HEIGHT: 66 IN | WEIGHT: 123 LBS | BODY MASS INDEX: 19.77 KG/M2

## 2017-09-18 RX ORDER — HYDROMORPHONE HYDROCHLORIDE 2 MG/1
2 TABLET ORAL AS NEEDED
COMMUNITY

## 2017-09-18 NOTE — PERIOP NOTES
Patient verified name, , and surgery as listed in [facility]. Type 1B surgery, modified phone assessment complete. Orders in EMR- received. Labs per surgeon: none  Labs per anesthesia protocol: none needed    Patient answered medical/surgical history questions at their best of ability. All prior to admission medications documented in [facility]. Patient instructed to take the following medications the day of surgery according to anesthesia guidelines with a small sip of water: synthroid, dilaudid PRN, aspirin. Hold all vitamins 7 days prior to surgery and NSAIDS 5 days prior to surgery. Medications to be held- vitamins. Patient instructed on the following:  Arrive at A Entrance, time of arrival to be called the day before by 1700  NPO after midnight including gum, mints, and ice chips  Responsible adult must drive patient to the hospital, stay during surgery, and patient will need supervision 24 hours after anesthesia  Use antibacterial soap in shower the night before surgery and on the morning of surgery  Leave all valuables (money and jewelry) at home but bring insurance card and ID on DOS  Do not wear make-up, nail polish, lotions, cologne, perfumes, powders, or oil on skin. Patient teach back successful and patient demonstrates knowledge of instruction.

## 2017-09-19 ENCOUNTER — HOSPITAL ENCOUNTER (OUTPATIENT)
Age: 62
Setting detail: OUTPATIENT SURGERY
Discharge: HOME | End: 2017-09-19
Attending: ORTHOPAEDIC SURGERY | Admitting: ORTHOPAEDIC SURGERY
Payer: COMMERCIAL

## 2017-09-19 ENCOUNTER — ANESTHESIA (OUTPATIENT)
Dept: SURGERY | Age: 62
End: 2017-09-19
Payer: COMMERCIAL

## 2017-09-19 VITALS
DIASTOLIC BLOOD PRESSURE: 56 MMHG | TEMPERATURE: 96.6 F | BODY MASS INDEX: 20.27 KG/M2 | HEART RATE: 94 BPM | HEIGHT: 66 IN | SYSTOLIC BLOOD PRESSURE: 118 MMHG | OXYGEN SATURATION: 98 % | RESPIRATION RATE: 20 BRPM | WEIGHT: 126.13 LBS

## 2017-09-19 PROBLEM — Z96.659 TOTAL KNEE REPLACEMENT STATUS: Status: ACTIVE | Noted: 2017-09-19

## 2017-09-19 PROCEDURE — 74011250636 HC RX REV CODE- 250/636

## 2017-09-19 PROCEDURE — 74011250637 HC RX REV CODE- 250/637: Performed by: ANESTHESIOLOGY

## 2017-09-19 PROCEDURE — 74011000250 HC RX REV CODE- 250: Performed by: ORTHOPAEDIC SURGERY

## 2017-09-19 PROCEDURE — 76010010054 HC POST OP PAIN BLOCK: Performed by: ORTHOPAEDIC SURGERY

## 2017-09-19 PROCEDURE — 76060000031 HC ANESTHESIA FIRST 0.5 HR: Performed by: ORTHOPAEDIC SURGERY

## 2017-09-19 PROCEDURE — 74011250636 HC RX REV CODE- 250/636: Performed by: ANESTHESIOLOGY

## 2017-09-19 PROCEDURE — 76210000016 HC OR PH I REC 1 TO 1.5 HR: Performed by: ORTHOPAEDIC SURGERY

## 2017-09-19 PROCEDURE — 74011000250 HC RX REV CODE- 250

## 2017-09-19 PROCEDURE — 74011250636 HC RX REV CODE- 250/636: Performed by: ORTHOPAEDIC SURGERY

## 2017-09-19 PROCEDURE — 76010000154 HC OR TIME FIRST 0.5 HR: Performed by: ORTHOPAEDIC SURGERY

## 2017-09-19 PROCEDURE — 76210000020 HC REC RM PH II FIRST 0.5 HR: Performed by: ORTHOPAEDIC SURGERY

## 2017-09-19 PROCEDURE — 74011000250 HC RX REV CODE- 250: Performed by: ANESTHESIOLOGY

## 2017-09-19 PROCEDURE — 76942 ECHO GUIDE FOR BIOPSY: CPT | Performed by: ORTHOPAEDIC SURGERY

## 2017-09-19 RX ORDER — ONDANSETRON 2 MG/ML
4 INJECTION INTRAMUSCULAR; INTRAVENOUS ONCE
Status: DISCONTINUED | OUTPATIENT
Start: 2017-09-19 | End: 2017-09-19 | Stop reason: HOSPADM

## 2017-09-19 RX ORDER — DIPHENHYDRAMINE HYDROCHLORIDE 50 MG/ML
12.5 INJECTION, SOLUTION INTRAMUSCULAR; INTRAVENOUS ONCE
Status: DISCONTINUED | OUTPATIENT
Start: 2017-09-19 | End: 2017-09-19 | Stop reason: HOSPADM

## 2017-09-19 RX ORDER — LIDOCAINE HYDROCHLORIDE 20 MG/ML
INJECTION, SOLUTION EPIDURAL; INFILTRATION; INTRACAUDAL; PERINEURAL AS NEEDED
Status: DISCONTINUED | OUTPATIENT
Start: 2017-09-19 | End: 2017-09-19 | Stop reason: HOSPADM

## 2017-09-19 RX ORDER — PROPOFOL 10 MG/ML
INJECTION, EMULSION INTRAVENOUS AS NEEDED
Status: DISCONTINUED | OUTPATIENT
Start: 2017-09-19 | End: 2017-09-19 | Stop reason: HOSPADM

## 2017-09-19 RX ORDER — LIDOCAINE HYDROCHLORIDE 10 MG/ML
0.1 INJECTION INFILTRATION; PERINEURAL AS NEEDED
Status: DISCONTINUED | OUTPATIENT
Start: 2017-09-19 | End: 2017-09-19 | Stop reason: HOSPADM

## 2017-09-19 RX ORDER — OXYCODONE HYDROCHLORIDE 5 MG/1
5 TABLET ORAL ONCE
Status: COMPLETED | OUTPATIENT
Start: 2017-09-19 | End: 2017-09-19

## 2017-09-19 RX ORDER — SODIUM CHLORIDE, SODIUM LACTATE, POTASSIUM CHLORIDE, CALCIUM CHLORIDE 600; 310; 30; 20 MG/100ML; MG/100ML; MG/100ML; MG/100ML
INJECTION, SOLUTION INTRAVENOUS
Status: DISCONTINUED | OUTPATIENT
Start: 2017-09-19 | End: 2017-09-19 | Stop reason: HOSPADM

## 2017-09-19 RX ORDER — ACETAMINOPHEN 500 MG
500 TABLET ORAL ONCE
Status: DISCONTINUED | OUTPATIENT
Start: 2017-09-19 | End: 2017-09-19 | Stop reason: HOSPADM

## 2017-09-19 RX ORDER — SODIUM CHLORIDE 0.9 % (FLUSH) 0.9 %
5-10 SYRINGE (ML) INJECTION AS NEEDED
Status: DISCONTINUED | OUTPATIENT
Start: 2017-09-19 | End: 2017-09-19 | Stop reason: HOSPADM

## 2017-09-19 RX ORDER — MIDAZOLAM HYDROCHLORIDE 1 MG/ML
2 INJECTION, SOLUTION INTRAMUSCULAR; INTRAVENOUS
Status: DISCONTINUED | OUTPATIENT
Start: 2017-09-19 | End: 2017-09-19 | Stop reason: HOSPADM

## 2017-09-19 RX ORDER — FENTANYL CITRATE 50 UG/ML
100 INJECTION, SOLUTION INTRAMUSCULAR; INTRAVENOUS AS NEEDED
Status: DISCONTINUED | OUTPATIENT
Start: 2017-09-19 | End: 2017-09-19 | Stop reason: HOSPADM

## 2017-09-19 RX ORDER — NALOXONE HYDROCHLORIDE 0.4 MG/ML
0.1 INJECTION, SOLUTION INTRAMUSCULAR; INTRAVENOUS; SUBCUTANEOUS AS NEEDED
Status: DISCONTINUED | OUTPATIENT
Start: 2017-09-19 | End: 2017-09-19 | Stop reason: HOSPADM

## 2017-09-19 RX ORDER — EPINEPHRINE 1 MG/ML
INJECTION, SOLUTION, CONCENTRATE INTRAVENOUS AS NEEDED
Status: DISCONTINUED | OUTPATIENT
Start: 2017-09-19 | End: 2017-09-19 | Stop reason: HOSPADM

## 2017-09-19 RX ORDER — ROPIVACAINE HYDROCHLORIDE 2 MG/ML
INJECTION, SOLUTION EPIDURAL; INFILTRATION; PERINEURAL AS NEEDED
Status: DISCONTINUED | OUTPATIENT
Start: 2017-09-19 | End: 2017-09-19 | Stop reason: HOSPADM

## 2017-09-19 RX ORDER — SODIUM CHLORIDE, SODIUM LACTATE, POTASSIUM CHLORIDE, CALCIUM CHLORIDE 600; 310; 30; 20 MG/100ML; MG/100ML; MG/100ML; MG/100ML
75 INJECTION, SOLUTION INTRAVENOUS CONTINUOUS
Status: DISCONTINUED | OUTPATIENT
Start: 2017-09-19 | End: 2017-09-19 | Stop reason: HOSPADM

## 2017-09-19 RX ORDER — BUPIVACAINE HYDROCHLORIDE 5 MG/ML
INJECTION, SOLUTION EPIDURAL; INTRACAUDAL AS NEEDED
Status: DISCONTINUED | OUTPATIENT
Start: 2017-09-19 | End: 2017-09-19 | Stop reason: HOSPADM

## 2017-09-19 RX ORDER — SODIUM CHLORIDE 0.9 % (FLUSH) 0.9 %
5-10 SYRINGE (ML) INJECTION EVERY 8 HOURS
Status: DISCONTINUED | OUTPATIENT
Start: 2017-09-19 | End: 2017-09-19 | Stop reason: HOSPADM

## 2017-09-19 RX ORDER — SODIUM CHLORIDE, SODIUM LACTATE, POTASSIUM CHLORIDE, CALCIUM CHLORIDE 600; 310; 30; 20 MG/100ML; MG/100ML; MG/100ML; MG/100ML
1000 INJECTION, SOLUTION INTRAVENOUS CONTINUOUS
Status: DISCONTINUED | OUTPATIENT
Start: 2017-09-19 | End: 2017-09-19 | Stop reason: HOSPADM

## 2017-09-19 RX ADMIN — OXYCODONE HYDROCHLORIDE 5 MG: 5 TABLET ORAL at 08:19

## 2017-09-19 RX ADMIN — LIDOCAINE HYDROCHLORIDE 80 MG: 20 INJECTION, SOLUTION EPIDURAL; INFILTRATION; INTRACAUDAL; PERINEURAL at 07:22

## 2017-09-19 RX ADMIN — LIDOCAINE HYDROCHLORIDE 0.1 ML: 10 INJECTION, SOLUTION INFILTRATION; PERINEURAL at 06:18

## 2017-09-19 RX ADMIN — PROPOFOL 150 MG: 10 INJECTION, EMULSION INTRAVENOUS at 07:22

## 2017-09-19 RX ADMIN — MIDAZOLAM HYDROCHLORIDE 2 MG: 1 INJECTION, SOLUTION INTRAMUSCULAR; INTRAVENOUS at 06:46

## 2017-09-19 RX ADMIN — ROPIVACAINE HYDROCHLORIDE 20 ML: 2 INJECTION, SOLUTION EPIDURAL; INFILTRATION; PERINEURAL at 06:46

## 2017-09-19 RX ADMIN — FENTANYL CITRATE 100 MCG: 50 INJECTION, SOLUTION INTRAMUSCULAR; INTRAVENOUS at 06:46

## 2017-09-19 RX ADMIN — SODIUM CHLORIDE, SODIUM LACTATE, POTASSIUM CHLORIDE, AND CALCIUM CHLORIDE 1000 ML: 600; 310; 30; 20 INJECTION, SOLUTION INTRAVENOUS at 06:18

## 2017-09-19 RX ADMIN — SODIUM CHLORIDE, SODIUM LACTATE, POTASSIUM CHLORIDE, CALCIUM CHLORIDE: 600; 310; 30; 20 INJECTION, SOLUTION INTRAVENOUS at 07:15

## 2017-09-19 NOTE — H&P
The patient has stiff right TKA. The patient was see and examined in the office prior to today, there are no changes to the patient's conditions. They have tried conservative treatment for this condition; including lifestyle modifications and antiinflammatories and have failed. The necessity for the joint manipulation is still present, and the H&P is still current. The patient will be admitted today for a right knee manipulation.

## 2017-09-19 NOTE — OP NOTES
[facility]  Closed Manipulation of Total Knee Arthroplasty    Patient:Darlene Fernandes   : 1955  Medical Record ANGOJW:[id number]  Pre-operative Diagnosis:  Joint stiffness of knee, right [M25.661]  Post-operative Diagnosis: Joint stiffness of knee, right [M25.661]  Location: [place] [address]  Surgeon: Brayden Segura MD    Anesthesia: MAC  EBL: none    Procedure: The patient was brought to the PACU. An adductor canal block was administered. The patient the received general anesthesia after a timeout was done. A closed manipulation was performed on the right Knee. Preoperatively the ROM was 0 to 95 degrees. The knee was manipulated into full extension and then into full flexion. Adhesions within the knee could be felt to be broken loose. Upon completion of the manipulation, the patient had full range of motion 0 to 155 degrees with a recoil to 145 this was easily obtained. Ligaments were stable. Extensor mechanism was intact. Ice was applied to the knee.  The patient tolerated the procedure w/o difficulty     Signed By: Brayden Segura MD

## 2017-09-19 NOTE — IP AVS SNAPSHOT
Syeda Messer 
 
 
 [address] [place] [address] 
[phone] Patient: Raghu Hines MRN: [id number] [facility] You are allergic to the following Allergen Reactions Morphine Itching Pt states has since had a morphine derivative and had no problems/concerns Recent Documentation Height Weight BMI OB Status Smoking Status 1.664 m 57.2 kg 20.67 kg/m2 Hysterectomy Former Smoker Emergency Contacts Name Discharge Info Relation Home Work Mobile [facility] DISCHARGE CAREGIVER [3] Spouse [3] [phone] CAREGIVER [3] Daughter [21] [phone] About your hospitalization You were admitted on:  September 19, 2017 You last received care in the:  [facility] PACU You were discharged on:  September 19, 2017 Unit phone number:  [phone] Why you were hospitalized Your primary diagnosis was:  Not on File Your diagnoses also included: Total Knee Replacement Status Providers Seen During Your Hospitalizations Provider Role Specialty Primary office phone Taylor Gabriel MD Attending Provider Orthopedic Surgery [phone] Your Primary Care Physician (PCP) Primary Care Physician Office Phone Office Fax [phone] Follow-up Information Follow up With Details Comments Contact Info Taylor Gabriel MD   [place] [address] 
[phone] Current Discharge Medication List  
  
CONTINUE these medications which have NOT CHANGED Dose & Instructions Dispensing Information Comments Morning Noon Evening Bedtime  
 aspirin delayed-release 325 mg tablet Your last dose was: Your next dose is:    
   
   
 Dose:  325 mg Take 1 Tab by mouth every twelve (12) hours every twelve (12) hours. Quantity:  120 Tab Refills:  0  
     
   
   
   
  
 DILAUDID 2 mg tablet Generic drug:  HYDROmorphone Your last dose was: Your next dose is:    
   
   
 Dose:  2 mg Take 2 mg by mouth as needed for Pain. Refills:  0  
     
   
   
   
  
 levothyroxine 125 mcg tablet Commonly known as:  SYNTHROID Your last dose was: Your next dose is:    
   
   
 Dose:  125 mcg Take 125 mcg by mouth Daily (before breakfast). Indications: hypothyroidism Refills:  0  
     
   
   
   
  
 oxyCODONE IR 10 mg Tab immediate release tablet Commonly known as:  Amber Parody Your last dose was: Your next dose is:    
   
   
 Dose:  10 mg Take 1 Tab by mouth every four (4) hours as needed. Max Daily Amount: 60 mg.  
 Quantity:  60 Tab Refills:  0  
     
   
   
   
  
 promethazine 25 mg tablet Commonly known as:  PHENERGAN Your last dose was: Your next dose is:    
   
   
 Dose:  25 mg Take 1 Tab by mouth every six (6) hours as needed for Nausea. Quantity:  50 Tab Refills:  0 STOOL SOFTENER 100 mg capsule Generic drug:  docusate sodium Your last dose was: Your next dose is:    
   
   
 Dose:  300 mg Take 300 mg by mouth daily (after breakfast). Refills:  0 Discharge Instructions Instructions Following Ambulatory Surgery Activity · As tolerated and directed by your doctor · Bathe or shower as directed by your doctor Diet · Clear liquids until no nausea or vomiting; then light diet for the first day · Advance to regular diet on second day, unless your doctor orders otherwise · If nausea and vomiting continues, call your doctor Pain · Take pain medication as directed by your doctor ·  Call your doctor if pain is NOT relieved by medication · DO NOT take aspirin or blood thinners until directed by your doctor Follow-Up Phone Calls · Will be made nursing staff · If you have any problems, call your doctor as needed Call your doctor if 
· Excessive bleeding that does not stop after holding mild pressure over the area · Temperature of 101 degrees F or above · Redness,excessive swelling or bruising, and/or green or yellow, smelly discharge from incision After Anesthesia · For the first 24 hours: DO NOT Drive, Drink alcoholic beverages, or Make important decisions · Be aware of dizziness following anesthesia and while taking pain medication Medication changes have been made by your doctor; see Oswaldo bowman Continue home medications as previously prescribed. Discharge Orders None Introducing [facility]! [facility] introduces [employer] patient portal. Now you can access parts of your medical record, email your doctor's office, and request medication refills online. 1. In your internet browser, go to [email or web address] 2. Click on the First Time User? Click Here link in the Sign In box. You will see the New Member Sign Up page. 3. Enter your [employer] Access Code exactly as it appears below. You will not need to use this code after youve completed the sign-up process. If you do not sign up before the expiration date, you must request a new code. · [employer] Access Code: [email or web address] Expires: 12/17/2017  6:48 AM 
 
4. Enter the last four digits of your Social Security Number (xxxx) and Date of Birth (mm/dd/yyyy) as indicated and click Submit. You will be taken to the next sign-up page. 5. Create a [employer] ID. This will be your [employer] login ID and cannot be changed, so think of one that is secure and easy to remember. 6. Create a [employer] password. You can change your password at any time. 7. Enter your Password Reset Question and Answer. This can be used at a later time if you forget your password. 8. Enter your e-mail address.  You will receive e-mail notification when new information is available in [employer]. 9. Click Sign Up. You can now view and download portions of your medical record. 10. Click the Download Summary menu link to download a portable copy of your medical information. If you have questions, please visit the Frequently Asked Questions section of the [employer] website. Remember, [employer] is NOT to be used for urgent needs. For medical emergencies, dial 911. Now available from your iPhone and Android! General Information Please provide this summary of care documentation to your next provider. Patient Signature:  ____________________________________________________________ Date:  ____________________________________________________________  
  
[facility] Provider Signature:  ____________________________________________________________ Date:  ____________________________________________________________

## 2017-09-19 NOTE — ANESTHESIA PREPROCEDURE EVALUATION
Anesthetic History   No history of anesthetic complications            Review of Systems / Medical History  Pertinent labs reviewed    Pulmonary  Within defined limits                 Neuro/Psych         Headaches (migraine)     Cardiovascular  Within defined limits                Exercise tolerance: >4 METS     GI/Hepatic/Renal  Within defined limits              Endo/Other      Hypothyroidism       Other Findings              Physical Exam    Airway  Mallampati: II  TM Distance: 4 - 6 cm  Neck ROM: normal range of motion   Mouth opening: Normal     Cardiovascular  Regular rate and rhythm,  S1 and S2 normal,  no murmur, click, rub, or gallop             Dental    Dentition: Caps/crowns     Pulmonary  Breath sounds clear to auscultation               Abdominal  GI exam deferred       Other Findings            Anesthetic Plan    ASA: 2  Anesthesia type: total IV anesthesia      Post-op pain plan if not by surgeon: peripheral nerve block single    Induction: Intravenous  Anesthetic plan and risks discussed with: Patient

## 2017-09-19 NOTE — DISCHARGE INSTRUCTIONS
Instructions Following Ambulatory Surgery    Activity  · As tolerated and directed by your doctor  · Bathe or shower as directed by your doctor    Diet  · Clear liquids until no nausea or vomiting; then light diet for the first day  · Advance to regular diet on second day, unless your doctor orders otherwise  · If nausea and vomiting continues, call your doctor    Pain  · Take pain medication as directed by your doctor  ·  Call your doctor if pain is NOT relieved by medication  · DO NOT take aspirin or blood thinners until directed by your doctor    Follow-Up Phone Calls  · Will be made nursing staff  · If you have any problems, call your doctor as needed    Call your doctor if  · Excessive bleeding that does not stop after holding mild pressure over the area  · Temperature of 101 degrees F or above  · Redness,excessive swelling or bruising, and/or green or yellow, smelly discharge from incision    After Anesthesia  · For the first 24 hours: DO NOT Drive, Drink alcoholic beverages, or Make important decisions  · Be aware of dizziness following anesthesia and while taking pain medication    Medication changes have been made by your doctor; see Vicki bowman  Continue home medications as previously prescribed.

## 2017-09-19 NOTE — ANESTHESIA POSTPROCEDURE EVALUATION
Post-Anesthesia Evaluation and Assessment    Patient: Katie Alberts MRN: [id number]  SSN: [id number]    YOB: 1955  Age: 57 y.o. Sex: female       Cardiovascular Function/Vital Signs  Visit Vitals    /56    Pulse 75    Temp 35.9 °C (96.6 °F)    Resp 20    Ht 5' 5.5\" (1.664 m)    Wt 57.2 kg (126 lb 2 oz)    SpO2 100%    BMI 20.67 kg/m2       Patient is status post total IV anesthesia anesthesia for Procedure(s):  MANIPULATION RIGHT KNEE. Nausea/Vomiting: None    Postoperative hydration reviewed and adequate. Pain:  Pain Scale 1: Numeric (0 - 10) (09/19/17 0548)  Pain Intensity 1: 3 (09/19/17 0548)   Managed    Neurological Status:   Neuro (WDL): Within Defined Limits (09/19/17 0600)   At baseline    Mental Status and Level of Consciousness: Arousable    Pulmonary Status:   O2 Device: CO2 nasal cannula (09/19/17 0732)   Adequate oxygenation and airway patent    Complications related to anesthesia: None    Post-anesthesia assessment completed.  No concerns    Signed By: Humble Daniels MD     September 19, 2017

## 2017-09-19 NOTE — ANESTHESIA PROCEDURE NOTES
Peripheral Block    Start time: 9/19/2017 6:43 AM  End time: 9/19/2017 6:46 AM  Performed by: Pawan Gabriel  Authorized by: Pawan Gabriel       Pre-procedure: Indications: at surgeon's request, post-op pain management and procedure for pain    Preanesthetic Checklist: patient identified, risks and benefits discussed, site marked, timeout performed, anesthesia consent given and patient being monitored    Timeout Time: 06:43          Block Type:   Block Type:   Adductor canal  Laterality:  Right  Monitoring:  Standard ASA monitoring, responsive to questions, oxygen, continuous pulse ox, frequent vital sign checks and heart rate  Injection Technique:  Single shot  Procedures: ultrasound guided    Patient Position: supine  Prep: chlorhexidine    Location:  Mid thigh  Needle Type:  Stimuplex  Needle Gauge:  22 G  Needle Localization:  Ultrasound guidance  Medication Injected:  0.2%  ropivacaine  Volume (mL):  20    Assessment:  Number of attempts:  1  Injection Assessment:  Incremental injection every 5 mL, no paresthesia, ultrasound image on chart, no intravascular symptoms, negative aspiration for blood and local visualized surrounding nerve on ultrasound  Patient tolerance:  Patient tolerated the procedure well with no immediate complications

## 2017-11-01 NOTE — PERIOP NOTES
Patient verified name, , and surgery as listed in [facility]. Type 3 surgery, Joint assessment complete. Labs per surgeon: cbc,bmp,ua- results within anesthesia guidelines. Mssa, pt,ptt results pending. EKG: today-  normal.    Hibiclens and instructions given per hospital policy. Nasal Swab collected per MD order and instructions for Mupirocin nasal ointment if required. Patient provided with handouts including Guide to Surgery, Pain Management, Hand Hygiene, Blood Transfusion Education, and [facility] Brochure. Patient answered medical/surgical history questions at their best of ability. All prior to admission medications documented in [facility]. Original medication prescription bottle not visualized during patient appointment. Patient instructed to hold all vitamins 7 days prior to surgery and NSAIDS 5 days prior to surgery. Medications to be held prior to surgery : aspirin - 5 days. Patient instructed to continue previous medications as prescribed prior to surgery and to take the following medications the day of surgery according to anesthesia guidelines with a small sip of water: levothyroxine. Patient taught back and verbalized understanding. no

## 2020-08-31 ENCOUNTER — HOSPITAL ENCOUNTER (OUTPATIENT)
Dept: LAB | Age: 65
Discharge: HOME | End: 2020-08-31

## 2020-08-31 PROCEDURE — 88305 TISSUE EXAM BY PATHOLOGIST: CPT

## 2022-03-19 PROBLEM — M17.11 ARTHRITIS OF KNEE, RIGHT: Status: ACTIVE | Noted: 2017-06-27

## 2022-03-19 PROBLEM — Z96.659 S/P TOTAL KNEE ARTHROPLASTY: Status: ACTIVE | Noted: 2017-06-28

## 2022-03-20 PROBLEM — Z96.659 TOTAL KNEE REPLACEMENT STATUS: Status: ACTIVE | Noted: 2017-09-19

## 2023-02-27 ENCOUNTER — TELEPHONE (OUTPATIENT)
Dept: ORTHOPEDIC SURGERY | Age: 68
End: 2023-02-27

## 2023-04-19 ENCOUNTER — OFFICE VISIT (OUTPATIENT)
Dept: ORTHOPEDIC SURGERY | Age: 68
End: 2023-04-19

## 2023-04-19 DIAGNOSIS — Z96.651 STATUS POST RIGHT KNEE REPLACEMENT: ICD-10-CM

## 2023-04-19 DIAGNOSIS — M17.12 LOCALIZED OSTEOARTHRITIS OF LEFT KNEE: Primary | ICD-10-CM

## 2023-04-19 RX ORDER — TRIAMCINOLONE ACETONIDE 40 MG/ML
40 INJECTION, SUSPENSION INTRA-ARTICULAR; INTRAMUSCULAR ONCE
Status: COMPLETED | OUTPATIENT
Start: 2023-04-19 | End: 2023-04-19

## 2023-04-19 RX ADMIN — TRIAMCINOLONE ACETONIDE 40 MG: 40 INJECTION, SUSPENSION INTRA-ARTICULAR; INTRAMUSCULAR at 16:16

## 2023-04-19 NOTE — PROGRESS NOTES
Socioeconomic History    Marital status:      Spouse name: Not on file    Number of children: Not on file    Years of education: Not on file    Highest education level: Not on file   Occupational History    Not on file   Tobacco Use    Smoking status: Former    Smokeless tobacco: Never    Tobacco comments:     Quit smoking: as teenager   Substance and Sexual Activity    Alcohol use: Yes    Drug use: Not on file    Sexual activity: Not on file   Other Topics Concern    Not on file   Social History Narrative    Not on file     Social Determinants of Health     Financial Resource Strain: Not on file   Food Insecurity: Not on file   Transportation Needs: Not on file   Physical Activity: Not on file   Stress: Not on file   Social Connections: Not on file   Intimate Partner Violence: Not on file   Housing Stability: Not on file       Review of Systems:  As per HPI. Pertinent positives and negatives are addressed with the patient, particularly those related to musculoskeletal concerns. Non-orthopaedic concerns were referred back to the primary care physician. PHYSICAL EXAMINATION:   The patient appears their stated age and they are in no distress. The lower extremities are as described below. Circulation is normal with palpable pedal pulses bilaterally and no edema. There is no lymph adenopathy in the popliteal or malleolar region. The skin is without stasis disease distally bilaterally. Hip ROM is smooth and painless. Knee range of motion is 0-120 degrees on the right and 0-120 degrees on the left. bilateral knee: There is 2mm of anterior/posterior translation and 2mm of medial/lateral instability bilaterally. There is 2 degrees of varus alignment in the bilateral knee. There is some pain to palpation over the medial joint line. Limb lengths are equal.  The gait is noted to be with a slight trendelenburg and antalgia. Straight leg test is negative. Quadriceps strength is good.   Sensation is

## 2023-06-05 ENCOUNTER — OFFICE VISIT (OUTPATIENT)
Dept: ORTHOPEDIC SURGERY | Age: 68
End: 2023-06-05
Payer: MEDICARE

## 2023-06-05 DIAGNOSIS — M17.12 LOCALIZED OSTEOARTHRITIS OF LEFT KNEE: Primary | ICD-10-CM

## 2023-06-05 PROCEDURE — 20611 DRAIN/INJ JOINT/BURSA W/US: CPT | Performed by: ORTHOPAEDIC SURGERY

## 2023-06-05 RX ORDER — HYALURONATE SODIUM 10 MG/ML
20 SYRINGE (ML) INTRAARTICULAR ONCE
Status: COMPLETED | OUTPATIENT
Start: 2023-06-05 | End: 2023-06-05

## 2023-06-05 RX ADMIN — Medication 20 MG: at 08:43

## 2023-06-05 NOTE — PROGRESS NOTES
Name: Margie Natarajan  YOB: 1955  Gender: female  MRN: [id number]      CC: Left Knee Pain     PROCEDURE: 1 of 3     DIAGNOSIS:   Encounter Diagnosis   Name Primary? Localized osteoarthritis of left knee Yes        [employer] US unit with variable frequency (6.0-15.0 MHz) linear transducer was used to visualize the retropatellar fat pad, patella tendon, patella, tibia, and to ensure proper intra-articular needle placement. Injection image was saved to patient's permanent chart. Procedure Note: The patient was placed in upright position with knee hanging freely from exam table. The left knee was prepped in sterile fashion using alcohol wipe. Using Mindray ultrasound guidance, a 22 gauge needle was then introduced into the knee joint from an infrapatellar approach and 2 mL of Euflexxa was injected freely. The needle was then removed, pressure hemostatis achieved, injection site was cleansed with alcohol wipe and dressed with band aid. The patient tolerated the procedure without complication. The patient  will follow up as scheduled.

## 2023-06-26 ENCOUNTER — OFFICE VISIT (OUTPATIENT)
Dept: ORTHOPEDIC SURGERY | Age: 68
End: 2023-06-26
Payer: MEDICARE

## 2023-06-26 DIAGNOSIS — M17.12 LOCALIZED OSTEOARTHRITIS OF LEFT KNEE: Primary | ICD-10-CM

## 2023-06-26 PROCEDURE — 20611 DRAIN/INJ JOINT/BURSA W/US: CPT | Performed by: ORTHOPAEDIC SURGERY

## 2023-06-26 RX ORDER — HYALURONATE SODIUM 10 MG/ML
20 SYRINGE (ML) INTRAARTICULAR ONCE
Status: COMPLETED | OUTPATIENT
Start: 2023-06-26 | End: 2023-06-26

## 2023-06-26 RX ADMIN — Medication 20 MG: at 08:33

## 2023-10-13 ENCOUNTER — OFFICE VISIT (OUTPATIENT)
Dept: ORTHOPEDIC SURGERY | Age: 68
End: 2023-10-13
Payer: MEDICARE

## 2023-10-13 DIAGNOSIS — M47.816 LUMBAR SPONDYLOSIS: ICD-10-CM

## 2023-10-13 DIAGNOSIS — M25.551 RIGHT HIP PAIN: Primary | ICD-10-CM

## 2023-10-13 PROCEDURE — 99214 OFFICE O/P EST MOD 30 MIN: CPT | Performed by: PHYSICIAN ASSISTANT

## 2023-10-13 PROCEDURE — G8421 BMI NOT CALCULATED: HCPCS | Performed by: PHYSICIAN ASSISTANT

## 2023-10-13 PROCEDURE — G8428 CUR MEDS NOT DOCUMENT: HCPCS | Performed by: PHYSICIAN ASSISTANT

## 2023-10-13 PROCEDURE — 3017F COLORECTAL CA SCREEN DOC REV: CPT | Performed by: PHYSICIAN ASSISTANT

## 2023-10-13 PROCEDURE — 4004F PT TOBACCO SCREEN RCVD TLK: CPT | Performed by: PHYSICIAN ASSISTANT

## 2023-10-13 PROCEDURE — G8484 FLU IMMUNIZE NO ADMIN: HCPCS | Performed by: PHYSICIAN ASSISTANT

## 2023-10-13 PROCEDURE — 1090F PRES/ABSN URINE INCON ASSESS: CPT | Performed by: PHYSICIAN ASSISTANT

## 2023-10-13 PROCEDURE — G8400 PT W/DXA NO RESULTS DOC: HCPCS | Performed by: PHYSICIAN ASSISTANT

## 2023-10-13 PROCEDURE — 1123F ACP DISCUSS/DSCN MKR DOCD: CPT | Performed by: PHYSICIAN ASSISTANT

## 2023-10-13 NOTE — PROGRESS NOTES
Name: Jae Gerber  YOB: 1955  Gender: female  MRN: [id number]    CC:   Chief Complaint   Patient presents with    Hip Pain     Right hip pain / LB spine          HPI:   The pain has been present for 2 months, is intermittent, mild to moderate in severity, and unchanged. It hurts at night when attempting to lay on her right side. There was not an acute injury to the hip. The pain is located over the right side low back, buttock, and lateral aspect of right hip. It hurts to walk and pain worsens with increased distance. The pain does radiate down the lateral thigh at times. Numbness and tingling are not noted. The patient is not having difficulty putting socks and shoes on. Treatment so far has been massage and stretching therapy with short term relief. Review of Systems  As per HPI. Pertinent positives and negatives are addressed with the patient, particularly those related to musculoskeletal concerns. Non-orthopaedic concerns were referred back to the primary care physician. [address]:    Current Outpatient Medications:     aspirin 325 MG EC tablet, Take 325 mg by mouth every 12 hours, Disp: , Rfl:     docusate (COLACE, DULCOLAX) 100 MG CAPS, Take 300 mg by mouth, Disp: , Rfl:     HYDROmorphone (DILAUDID) 2 MG tablet, Take 2 mg by mouth as needed. , Disp: , Rfl:     levothyroxine (SYNTHROID) 125 MCG tablet, Take 125 mcg by mouth every morning (before breakfast), Disp: , Rfl:     oxyCODONE HCl (OXY-IR) 10 MG immediate release tablet, Take 10 mg by mouth every 4 hours as needed. , Disp: , Rfl:     promethazine (PHENERGAN) 25 MG tablet, Take 25 mg by mouth every 6 hours as needed, Disp: , Rfl:   Allergies   Allergen Reactions    Morphine Itching     Pt states has since had a morphine derivative and had no problems/concerns      Past Medical History:   Diagnosis Date    Migraine     Osteoarthritis     Osteopenia      .pmh  Past Surgical History:   Procedure Laterality Date

## 2023-10-19 ENCOUNTER — OFFICE VISIT (OUTPATIENT)
Dept: ORTHOPEDIC SURGERY | Age: 68
End: 2023-10-19

## 2023-10-19 DIAGNOSIS — M51.36 DDD (DEGENERATIVE DISC DISEASE), LUMBAR: ICD-10-CM

## 2023-10-19 DIAGNOSIS — M54.16 LUMBAR RADICULOPATHY: Primary | ICD-10-CM

## 2023-10-19 DIAGNOSIS — M47.816 FACET ARTHROPATHY, LUMBAR: ICD-10-CM

## 2023-10-19 RX ORDER — KETOCONAZOLE 20 MG/G
CREAM TOPICAL
COMMUNITY
Start: 2023-07-20

## 2023-10-19 RX ORDER — GABAPENTIN 100 MG/1
100 CAPSULE ORAL NIGHTLY
Qty: 30 CAPSULE | Refills: 2 | Status: SHIPPED | OUTPATIENT
Start: 2023-10-19 | End: 2024-01-17

## 2023-10-19 RX ORDER — BUTALBITAL, ASPIRIN, AND CAFFEINE 325; 50; 40 MG/1; MG/1; MG/1
CAPSULE ORAL
COMMUNITY
Start: 2023-09-15

## 2023-10-19 RX ORDER — LEVOTHYROXINE SODIUM 112 UG/1
TABLET ORAL
COMMUNITY
Start: 2023-07-30

## 2023-10-19 NOTE — PROGRESS NOTES
Name: Angelika Franklin  YOB: 1955  Gender: female  MRN: [id number]    CC: Hip Pain (Right hip pain with leg pain, buttock pain, and some groin pain)       HPI: This is a 77y.o. year old female who referred to me by Dusty Peck for evaluation of right hip pain. She has had 3-month history of pain in the right side of the lower back and buttock radiates eating to the right lateral hip and sometimes into the groin lateral leg to the lower leg. It is worse with stairs and lying down at night she has difficulty rolling over on either side. She has to go up and down stairs 1 at a time rather than reciprocal.  She has been working in physical therapy for 4 weeks for strengthening her lower back and her hips and glutes however symptoms have not improved. She has tried meloxicam and diclofenac and symptoms continue to be persistent. She is had chiropractic adjustments without improvement. History was obtained by patient    The patient denies any change in bowel or bladder function since the onset of the symptoms. she  has not had lumbar surgery in the past.           10/19/2023     1:14 PM   AMB PAIN ASSESSMENT   Location of Pain Hip    Location Modifiers Right   Severity of Pain 8   Quality of Pain Aching   Duration of Pain Persistent   Frequency of Pain Constant   Date Pain First Started 7/3/2023   Aggravating Factors Stairs; Other (Comment)    Limiting Behavior Some   Relieving Factors Other (Comment)    Result of Injury No   Work-Related Injury No   Are there other pain locations you wish to document? No       Significant value            ROS/Meds/PSH/PMH/FH/SH: I personally reviewed the patient's collected intake data.   Below are the pertinents:    Allergies   Allergen Reactions    Morphine Itching     Pt states has since had a morphine derivative and had no problems/concerns     Codeine Itching         Current Outpatient Medications:     butalbital-aspirin-caffeine (FIORINAL) -40 MG

## 2023-10-31 ENCOUNTER — OFFICE VISIT (OUTPATIENT)
Dept: ORTHOPEDIC SURGERY | Age: 68
End: 2023-10-31
Payer: MEDICARE

## 2023-10-31 DIAGNOSIS — M47.816 FACET ARTHROPATHY, LUMBAR: ICD-10-CM

## 2023-10-31 DIAGNOSIS — M53.3 SACROILIAC JOINT DYSFUNCTION: Primary | ICD-10-CM

## 2023-10-31 DIAGNOSIS — M51.36 DDD (DEGENERATIVE DISC DISEASE), LUMBAR: ICD-10-CM

## 2023-10-31 PROCEDURE — G8427 DOCREV CUR MEDS BY ELIG CLIN: HCPCS | Performed by: PHYSICIAN ASSISTANT

## 2023-10-31 PROCEDURE — 1090F PRES/ABSN URINE INCON ASSESS: CPT | Performed by: PHYSICIAN ASSISTANT

## 2023-10-31 PROCEDURE — 1123F ACP DISCUSS/DSCN MKR DOCD: CPT | Performed by: PHYSICIAN ASSISTANT

## 2023-10-31 PROCEDURE — G8400 PT W/DXA NO RESULTS DOC: HCPCS | Performed by: PHYSICIAN ASSISTANT

## 2023-10-31 PROCEDURE — 99214 OFFICE O/P EST MOD 30 MIN: CPT | Performed by: PHYSICIAN ASSISTANT

## 2023-10-31 PROCEDURE — 1036F TOBACCO NON-USER: CPT | Performed by: PHYSICIAN ASSISTANT

## 2023-10-31 PROCEDURE — G8421 BMI NOT CALCULATED: HCPCS | Performed by: PHYSICIAN ASSISTANT

## 2023-10-31 PROCEDURE — G8484 FLU IMMUNIZE NO ADMIN: HCPCS | Performed by: PHYSICIAN ASSISTANT

## 2023-10-31 PROCEDURE — 3017F COLORECTAL CA SCREEN DOC REV: CPT | Performed by: PHYSICIAN ASSISTANT

## 2023-10-31 NOTE — PROGRESS NOTES
A referral to non surgical spine doctor was ordered. An order for PT on the Lumbar Spine and SI joint has been entered.

## 2023-10-31 NOTE — PROGRESS NOTES
Name: Zandra García  YOB: 1955  Gender: female  MRN: [id number]    CC: Back Pain (MRI results)       HPI: This is a 77y.o. year old female who referred to me by Luis Gardiner for evaluation of right hip pain. She has had 3-month history of pain in the right side of the lower back and buttock radiates eating to the right lateral hip and sometimes into the groin lateral leg to the lower leg. It is worse with stairs and lying down at night she has difficulty rolling over on either side. She has to go up and down stairs 1 at a time rather than reciprocal.  She has been working in physical therapy for 4 weeks for strengthening her lower back and her hips and glutes however symptoms have not improved. She has tried meloxicam and diclofenac and symptoms continue to be persistent. She is had chiropractic adjustments without improvement. History was obtained by patient    The patient denies any change in bowel or bladder function since the onset of the symptoms. she  has not had lumbar surgery in the past.    I Felt she had more of a L5 radiculopathy and ordered MRI scan for further evaluation. 10/19/2023     1:14 PM   AMB PAIN ASSESSMENT   Location of Pain Hip    Location Modifiers Right   Severity of Pain 8   Quality of Pain Aching   Duration of Pain Persistent   Frequency of Pain Constant   Date Pain First Started 7/3/2023   Aggravating Factors Stairs; Other (Comment)    Limiting Behavior Some   Relieving Factors Other (Comment)    Result of Injury No   Work-Related Injury No   Are there other pain locations you wish to document? No       Significant value            ROS/Meds/PSH/PMH/FH/SH: I personally reviewed the patient's collected intake data.   Below are the pertinents:    Allergies   Allergen Reactions    Morphine Itching     Pt states has since had a morphine derivative and had no problems/concerns     Codeine Itching         Current Outpatient Medications:     ketoconazole (NIZORAL)

## 2023-12-29 ENCOUNTER — OFFICE VISIT (OUTPATIENT)
Dept: ORTHOPEDIC SURGERY | Age: 68
End: 2023-12-29
Payer: MEDICARE

## 2023-12-29 DIAGNOSIS — M17.12 LOCALIZED OSTEOARTHRITIS OF LEFT KNEE: Primary | ICD-10-CM

## 2023-12-29 PROCEDURE — 99999 PR OFFICE/OUTPT VISIT,PROCEDURE ONLY: CPT | Performed by: PHYSICIAN ASSISTANT

## 2023-12-29 PROCEDURE — 20611 DRAIN/INJ JOINT/BURSA W/US: CPT | Performed by: PHYSICIAN ASSISTANT

## 2023-12-29 RX ORDER — HYALURONATE SODIUM 10 MG/ML
20 SYRINGE (ML) INTRAARTICULAR ONCE
Status: COMPLETED | OUTPATIENT
Start: 2023-12-29 | End: 2023-12-29

## 2023-12-29 RX ADMIN — Medication 20 MG: at 08:34

## 2023-12-29 NOTE — PROGRESS NOTES
Name: Tammie Jordan  YOB: 1955  Gender: female  MRN: [id number]      CC: Left Knee Pain     PROCEDURE: 1 of 3     DIAGNOSIS:   Encounter Diagnosis   Name Primary? Localized osteoarthritis of left knee Yes        [employer] US unit with variable frequency (6.0-15.0 MHz) linear transducer was used to visualize the retropatellar fat pad, patella tendon, patella, tibia, and to ensure proper intra-articular needle placement. Injection image was saved to patient's permanent chart. Procedure Note: The patient was placed in upright position with knee hanging freely from exam table. The left knee was prepped in sterile fashion using alcohol wipe. Using Mindray ultrasound guidance, a 22 gauge needle was then introduced into the knee joint from an infrapatellar approach and 2 mL of Euflexxa was injected freely. The needle was then removed, pressure hemostatis achieved, injection site was cleansed with alcohol wipe and dressed with band aid. The patient tolerated the procedure without complication. The patient  will follow up as scheduled.

## 2024-04-23 ENCOUNTER — TELEPHONE (OUTPATIENT)
Dept: ORTHOPEDIC SURGERY | Age: 69
End: 2024-04-23

## 2024-04-23 NOTE — TELEPHONE ENCOUNTER
Patient states that she wants to get another gel injection and wants to know if there has been enough of time since the last injection.  Please advise.

## 2024-04-24 ENCOUNTER — TELEPHONE (OUTPATIENT)
Dept: ORTHOPEDIC SURGERY | Age: 69
End: 2024-04-24

## 2024-04-24 NOTE — TELEPHONE ENCOUNTER
From looking at previous messages looks like patient had last gel injection in June last year so she can have more, correct? Note states she has to wait 6 months, can get lata inj  it has been that long if I am looking at this correctly. Please advise

## 2024-06-03 ENCOUNTER — TELEPHONE (OUTPATIENT)
Dept: ORTHOPEDIC SURGERY | Age: 69
End: 2024-06-03

## 2024-06-03 NOTE — TELEPHONE ENCOUNTER
Left vm letting patient know I need to reschedule her gel injection appts  The new schedule will be as follows:  7/1-9:45 7/8-9:45  7/15-9:45  All appts are at [address]  Will send [facility] message as well and will try calling patient back again tomorrow to update her of the new appt dates

## 2024-07-01 ENCOUNTER — OFFICE VISIT (OUTPATIENT)
Dept: ORTHOPEDIC SURGERY | Age: 69
End: 2024-07-01
Payer: MEDICARE

## 2024-07-01 DIAGNOSIS — M17.12 LOCALIZED OSTEOARTHRITIS OF LEFT KNEE: Primary | ICD-10-CM

## 2024-07-01 PROCEDURE — G8421 BMI NOT CALCULATED: HCPCS | Performed by: PHYSICIAN ASSISTANT

## 2024-07-01 PROCEDURE — G8400 PT W/DXA NO RESULTS DOC: HCPCS | Performed by: PHYSICIAN ASSISTANT

## 2024-07-01 PROCEDURE — 20611 DRAIN/INJ JOINT/BURSA W/US: CPT | Performed by: PHYSICIAN ASSISTANT

## 2024-07-01 PROCEDURE — G8428 CUR MEDS NOT DOCUMENT: HCPCS | Performed by: PHYSICIAN ASSISTANT

## 2024-07-01 PROCEDURE — 3017F COLORECTAL CA SCREEN DOC REV: CPT | Performed by: PHYSICIAN ASSISTANT

## 2024-07-01 PROCEDURE — 1036F TOBACCO NON-USER: CPT | Performed by: PHYSICIAN ASSISTANT

## 2024-07-01 PROCEDURE — 1123F ACP DISCUSS/DSCN MKR DOCD: CPT | Performed by: PHYSICIAN ASSISTANT

## 2024-07-01 PROCEDURE — 1090F PRES/ABSN URINE INCON ASSESS: CPT | Performed by: PHYSICIAN ASSISTANT

## 2024-07-01 PROCEDURE — 99214 OFFICE O/P EST MOD 30 MIN: CPT | Performed by: PHYSICIAN ASSISTANT

## 2024-07-01 RX ORDER — HYALURONATE SODIUM 10 MG/ML
20 SYRINGE (ML) INTRAARTICULAR ONCE
Status: COMPLETED | OUTPATIENT
Start: 2024-07-01 | End: 2024-07-01

## 2024-07-01 RX ADMIN — Medication 20 MG: at 10:18

## 2024-07-01 NOTE — PROGRESS NOTES
ORTHOPEDIC SURGERY  1996    right knee    TOTAL KNEE ARTHROPLASTY Right 06/27/2017    Dr Sena     No family history on file.  Social History     Socioeconomic History    Marital status:      Spouse name: Not on file    Number of children: Not on file    Years of education: Not on file    Highest education level: Not on file   Occupational History    Not on file   Tobacco Use    Smoking status: Former    Smokeless tobacco: Never    Tobacco comments:     Quit smoking: as teenager   Substance and Sexual Activity    Alcohol use: Yes    Drug use: Not on file    Sexual activity: Not on file   Other Topics Concern    Not on file   Social History Narrative    Not on file     Social Determinants of Health     Financial Resource Strain: Not on file   Food Insecurity: Not on file   Transportation Needs: Not on file   Physical Activity: Not on file   Stress: Not on file   Social Connections: Not on file   Intimate Partner Violence: Not on file   Housing Stability: Not on file       Review of Systems:  As per HPI.  Pertinent positives and negatives are addressed with the patient, particularly those related to musculoskeletal concerns.   Non-orthopaedic concerns were referred back to the primary care physician.    PHYSICAL EXAMINATION:   The patient appears their stated age and they are in no distress.  The lower extremities are as described below.  Circulation is normal with palpable pedal pulses bilaterally and no edema.  There is no lymph adenopathy in the popliteal or malleolar region.  The skin is without stasis disease distally bilaterally.  Hip ROM is smooth and painless.  Knee range of motion is 0-120 degrees on the right and 3-120 degrees on the left.  left knee: There is 2mm of anterior/posterior translation and 2mm of medial/lateral instability bilaterally.  There is 2 degrees of varus alignment in the left knee.  There is some pain to palpation over the medial joint line.  Limb lengths are equal.  The gait

## 2024-07-15 ENCOUNTER — OFFICE VISIT (OUTPATIENT)
Dept: ORTHOPEDIC SURGERY | Age: 69
End: 2024-07-15
Payer: MEDICARE

## 2024-07-15 DIAGNOSIS — Z96.651 HISTORY OF TOTAL RIGHT KNEE REPLACEMENT: ICD-10-CM

## 2024-07-15 DIAGNOSIS — S80.212A ABRASION, KNEE, LEFT, INITIAL ENCOUNTER: ICD-10-CM

## 2024-07-15 DIAGNOSIS — M17.12 LOCALIZED OSTEOARTHRITIS OF LEFT KNEE: Primary | ICD-10-CM

## 2024-07-15 PROCEDURE — 20611 DRAIN/INJ JOINT/BURSA W/US: CPT | Performed by: ORTHOPAEDIC SURGERY

## 2024-07-15 PROCEDURE — 99213 OFFICE O/P EST LOW 20 MIN: CPT | Performed by: ORTHOPAEDIC SURGERY

## 2024-07-15 PROCEDURE — G8400 PT W/DXA NO RESULTS DOC: HCPCS | Performed by: ORTHOPAEDIC SURGERY

## 2024-07-15 PROCEDURE — 1123F ACP DISCUSS/DSCN MKR DOCD: CPT | Performed by: ORTHOPAEDIC SURGERY

## 2024-07-15 PROCEDURE — 1036F TOBACCO NON-USER: CPT | Performed by: ORTHOPAEDIC SURGERY

## 2024-07-15 PROCEDURE — G8421 BMI NOT CALCULATED: HCPCS | Performed by: ORTHOPAEDIC SURGERY

## 2024-07-15 PROCEDURE — G8428 CUR MEDS NOT DOCUMENT: HCPCS | Performed by: ORTHOPAEDIC SURGERY

## 2024-07-15 PROCEDURE — 1090F PRES/ABSN URINE INCON ASSESS: CPT | Performed by: ORTHOPAEDIC SURGERY

## 2024-07-15 PROCEDURE — 3017F COLORECTAL CA SCREEN DOC REV: CPT | Performed by: ORTHOPAEDIC SURGERY

## 2024-07-15 RX ORDER — HYALURONATE SODIUM 10 MG/ML
20 SYRINGE (ML) INTRAARTICULAR ONCE
Status: COMPLETED | OUTPATIENT
Start: 2024-07-15 | End: 2024-07-15

## 2024-07-15 RX ADMIN — Medication 20 MG: at 09:45

## 2024-07-15 NOTE — PROGRESS NOTES
Name: Belle Desai  YOB: 1955  Gender: female  MRN: [id number]      CC: Left Knee Pain.  She is post right knee arthroplasty in 2017 and this is done well.  She does also well with HP injections for known left knee arthritis.    On exam she got an abrasion over her left knee and lacks 10 degrees of full extension.  The right knee has good range of motion with no instability.    PROCEDURE: 2 of 3 HP    DIAGNOSIS:   Encounter Diagnosis   Name Primary?    Localized osteoarthritis of left knee Yes        [employer] US unit with variable frequency (6.0-15.0 MHz) linear transducer was used to visualize the retropatellar fat pad, patella tendon, patella, tibia, and to ensure proper intra-articular needle placement.  Injection image was saved to patient's permanent chart.    Procedure Note: Time out was performed which included identifying the patient by name and date of birth.  The procedure site was identified with all present in agreement including the patient and MA   The patient was placed in upright position with knee hanging freely from exam table.  The left knee was prepped in sterile fashion using alcohol wipe.  Using Mindray ultrasound guidance, a 22 gauge needle was then introduced into the knee joint from an infrapatellar approach and 2 mL of Euflexxa was injected freely.  The needle was then removed, pressure hemostasis achieved, injection site was cleansed with alcohol wipe and dressed with band aid.    The patient tolerated the procedure without complication.  The patient  will follow up as scheduled.    Though she has degenerative changes in her left knee she is not limited to enough to pursue replacement surgery.  Will see her back next week for her third shot.  Approximately 20 minutes was spent today in review of her radiographs and discussion of treatment options and her current injury and abrasion.

## 2024-07-22 ENCOUNTER — OFFICE VISIT (OUTPATIENT)
Dept: ORTHOPEDIC SURGERY | Age: 69
End: 2024-07-22
Payer: MEDICARE

## 2024-07-22 DIAGNOSIS — M17.12 LOCALIZED OSTEOARTHRITIS OF LEFT KNEE: Primary | ICD-10-CM

## 2024-07-22 PROCEDURE — 20611 DRAIN/INJ JOINT/BURSA W/US: CPT | Performed by: PHYSICIAN ASSISTANT

## 2024-07-22 RX ORDER — HYALURONATE SODIUM 10 MG/ML
20 SYRINGE (ML) INTRAARTICULAR ONCE
Status: COMPLETED | OUTPATIENT
Start: 2024-07-22 | End: 2024-07-22

## 2024-07-22 RX ADMIN — Medication 20 MG: at 14:24

## 2024-07-22 NOTE — PROGRESS NOTES
Name: Belle Desai  YOB: 1955  Gender: female  MRN: [id number]      CC: Left Knee Pain     PROCEDURE: 3 of 3     DIAGNOSIS:   Encounter Diagnoses   Name Primary?    Localized osteoarthritis of left knee Yes    Leg mass, right     Status post total knee replacement, left         [employer] US unit with variable frequency (6.0-15.0 MHz) linear transducer was used to visualize the retropatellar fat pad, patella tendon, patella, tibia, and to ensure proper intra-articular needle placement.  Injection image was saved to patient's permanent chart.    Procedure Note: Time out was performed which included identifying the patient by name and date of birth.  The procedure site was identified with all present in agreement.   The patient was placed in upright position with knee hanging freely from exam table.  The left knee was prepped in sterile fashion using alcohol wipe.  Using Mindray ultrasound guidance, a 22 gauge needle was then introduced into the knee joint from an infrapatellar approach and 2 mL of Euflexxa was injected freely.  The needle was then removed, pressure hemostasis achieved, injection site was cleansed with alcohol wipe and dressed with band aid.    The patient tolerated the procedure without complication.  She reports substantial relief of her left knee pain and plans to return in 6 months to repeat HP.

## 2024-12-12 ENCOUNTER — TELEPHONE (OUTPATIENT)
Dept: ORTHOPEDIC SURGERY | Age: 69
End: 2024-12-12

## 2024-12-12 DIAGNOSIS — M17.12 LOCALIZED OSTEOARTHRITIS OF LEFT KNEE: Primary | ICD-10-CM

## 2024-12-12 NOTE — TELEPHONE ENCOUNTER
Pt  needs  her  6 months  left knee  gels  authorized  and  scheduled  for January please    She had a 1/20/25 appt  on the books  for  a  recheck  and I  changed the  details and labeled for  a  new problem  for an  OV for her hip     But she  will be  ready  for  knee injections

## 2025-01-31 ENCOUNTER — OFFICE VISIT (OUTPATIENT)
Dept: ORTHOPEDIC SURGERY | Age: 70
End: 2025-01-31

## 2025-01-31 DIAGNOSIS — M17.12 LOCALIZED OSTEOARTHRITIS OF LEFT KNEE: Primary | ICD-10-CM

## 2025-01-31 DIAGNOSIS — M25.552 LEFT HIP PAIN: ICD-10-CM

## 2025-01-31 DIAGNOSIS — M47.816 LUMBAR SPONDYLOSIS: ICD-10-CM

## 2025-01-31 RX ORDER — HYALURONATE SODIUM 10 MG/ML
20 SYRINGE (ML) INTRAARTICULAR ONCE
Status: COMPLETED | OUTPATIENT
Start: 2025-01-31 | End: 2025-01-31

## 2025-01-31 RX ADMIN — Medication 20 MG: at 12:36

## 2025-01-31 NOTE — PROGRESS NOTES
Name: Belle Desai  YOB: 1955  Gender: female  MRN: [id number]      CHIEF COMPLAINT: Follow-up (Left hip/l-spine-getting xrays today/Left knee Euflexxa #1)      HPI:   The left hip pain has been present for bout 3 months, is intermittent, and is currently mild in severity.  It hurts at night when sleeping.  There was not an acute injury to the hip or knee.  The pain is located over the left hip and left knee  It hurts to walk and pain worsens with increased distance.  She primarily experiences increased pain over the lateral aspect of left hip when ascending stairs.   The pain does not radiate down the leg.      Numbness and tingling are not noted.  The patient is not having difficulty putting socks and shoes on.        Treatment so far has been anti-inflammatory medications, chiropractic care, and PT for stretching.  She has known left knee DJD and desires repeat left knee HP which has continued provide lasting pain relief.  She has history of lumbar spine DJD.    Review of Systems  As per HPI.  Pertinent positives and negatives are addressed with the patient, particularly those related to musculoskeletal concerns.  Non-orthopaedic concerns were referred back to the primary care physician.      PMFSH:    Current Outpatient Medications:     ketoconazole (NIZORAL) 2 % cream, APPLY TO AFFECTED TOENAILS AT BEDTIME, Disp: , Rfl:     butalbital-aspirin-caffeine (FIORINAL) -40 MG capsule, TAKE 1 CAPSULE BY MOUTH 2 (TWO) TIMES A DAY AS NEEDED FOR HEADACHES OR MIGRAINE, Disp: , Rfl:     levothyroxine (SYNTHROID) 112 MCG tablet, TAKE 1.5 TABLETS BY MOUTH ON SUNDAY, TAKE 1 TABLET ALL OTHER DAYS (Patient not taking: Reported on 10/19/2023), Disp: , Rfl:     gabapentin (NEURONTIN) 100 MG capsule, Take 1 capsule by mouth nightly for 90 days., Disp: 30 capsule, Rfl: 2    diclofenac (VOLTAREN) 75 MG EC tablet, Take 1 tablet by mouth 2 times daily, Disp: 60 tablet, Rfl: 0    aspirin 325 MG EC tablet, Take 325

## 2025-02-07 ENCOUNTER — OFFICE VISIT (OUTPATIENT)
Dept: ORTHOPEDIC SURGERY | Age: 70
End: 2025-02-07
Payer: MEDICARE

## 2025-02-07 DIAGNOSIS — M17.12 LOCALIZED OSTEOARTHRITIS OF LEFT KNEE: Primary | ICD-10-CM

## 2025-02-07 PROCEDURE — 20611 DRAIN/INJ JOINT/BURSA W/US: CPT | Performed by: PHYSICIAN ASSISTANT

## 2025-02-07 RX ORDER — HYALURONATE SODIUM 10 MG/ML
20 SYRINGE (ML) INTRAARTICULAR ONCE
Status: COMPLETED | OUTPATIENT
Start: 2025-02-07 | End: 2025-02-07

## 2025-02-07 RX ADMIN — Medication 20 MG: at 12:00

## 2025-02-07 NOTE — PROGRESS NOTES
Name: Belle Desai  YOB: 1955  Gender: female  MRN: [id number]      CC: Left Knee Pain     PROCEDURE: 2 of 3 HP    DIAGNOSIS:   Encounter Diagnosis   Name Primary?    Localized osteoarthritis of left knee Yes        [employer] US unit with variable frequency (6.0-15.0 MHz) linear transducer was used to visualize the retropatellar fat pad, patella tendon, patella, tibia, and to ensure proper intra-articular needle placement.  Injection image was saved to patient's permanent chart.    Procedure Note: Time out was performed which included identifying the patient by name and date of birth.  The procedure site was identified with all present in agreement.   The patient was placed in upright position with knee hanging freely from exam table.  The left knee was prepped in sterile fashion using alcohol wipe.  Using Mindray ultrasound guidance, a 22 gauge needle was then introduced into the knee joint from an infrapatellar approach and 2 mL of Euflexxa was injected freely.  The needle was then removed, pressure hemostasis achieved, injection site was cleansed with alcohol wipe and dressed with band aid.    The patient tolerated the procedure without complication.  The patient  will follow up as scheduled.  She is still experiencing some mild pain mostly over the lateral aspect of her left hip at night as well as some mild associated tenderness.  We discussed performing left hip bursa injection at follow-up if symptoms persist.  She also notes some mild pain in the left groin as well when ascending stairs and was able to elicit very minimal groin pain with hip joint flexion and rotation on exam today.  Left hip x-rays from 1/31 were unremarkable.  We discussed possibly getting an MRI of the left hip if her left groin pain becomes more pronounced.

## 2025-02-14 ENCOUNTER — OFFICE VISIT (OUTPATIENT)
Dept: ORTHOPEDIC SURGERY | Age: 70
End: 2025-02-14
Payer: MEDICARE

## 2025-02-14 DIAGNOSIS — M17.12 LOCALIZED OSTEOARTHRITIS OF LEFT KNEE: Primary | ICD-10-CM

## 2025-02-14 PROCEDURE — 20611 DRAIN/INJ JOINT/BURSA W/US: CPT | Performed by: PHYSICIAN ASSISTANT

## 2025-02-14 RX ORDER — HYALURONATE SODIUM 10 MG/ML
20 SYRINGE (ML) INTRAARTICULAR ONCE
Status: COMPLETED | OUTPATIENT
Start: 2025-02-14 | End: 2025-02-14

## 2025-02-14 RX ADMIN — Medication 20 MG: at 09:05

## 2025-02-14 NOTE — PROGRESS NOTES
Name: Belle Desai  YOB: 1955  Gender: female  MRN: [id number]      CC: Left Knee Pain     PROCEDURE: 3 of 3 HP    DIAGNOSIS:   Encounter Diagnosis   Name Primary?    Localized osteoarthritis of left knee Yes        HooftyMatch US unit with variable frequency (6.0-15.0 MHz) linear transducer was used to visualize the retropatellar fat pad, patella tendon, patella, tibia, and to ensure proper intra-articular needle placement.  Injection image was saved to patient's permanent chart.    Procedure Note: Time out was performed which included identifying the patient by name and date of birth.  The procedure site was identified with all present in agreement.   The patient was placed in upright position with knee hanging freely from exam table.  The left knee was prepped in sterile fashion using alcohol wipe.  Using Mindray ultrasound guidance, a 22 gauge needle was then introduced into the knee joint from an infrapatellar approach and 2 mL of Euflexxa was injected freely.  The needle was then removed, pressure hemostasis achieved, injection site was cleansed with alcohol wipe and dressed with band aid.    The patient tolerated the procedure without complication.  She reports moderate relief of her left knee pain thus far and plans to return in 6 months repeat HP or sooner for cortisone injection, if needed.

## 2025-03-27 ENCOUNTER — TELEPHONE (OUTPATIENT)
Dept: ORTHOPEDIC SURGERY | Age: 70
End: 2025-03-27

## 2025-03-27 NOTE — TELEPHONE ENCOUNTER
She has an upcoming hip injection with [facility] but would like to be put on a waiting list to come in sooner if possible.

## 2025-03-27 NOTE — TELEPHONE ENCOUNTER
Let patient know that with [facility] being out the last 2 weeks we are overbooked on Monday. They are in surgery Wednesday next week so next Friday is our first available. Patient voiced understanding.

## 2025-04-04 ENCOUNTER — TELEPHONE (OUTPATIENT)
Dept: ORTHOPEDIC SURGERY | Age: 70
End: 2025-04-04

## 2025-04-04 ENCOUNTER — OFFICE VISIT (OUTPATIENT)
Dept: ORTHOPEDIC SURGERY | Age: 70
End: 2025-04-04

## 2025-04-04 DIAGNOSIS — M25.552 LEFT HIP PAIN: Primary | ICD-10-CM

## 2025-04-04 RX ORDER — TRIAMCINOLONE ACETONIDE 40 MG/ML
40 INJECTION, SUSPENSION INTRA-ARTICULAR; INTRAMUSCULAR ONCE
Status: SHIPPED | OUTPATIENT
Start: 2025-04-04

## 2025-04-04 NOTE — PROGRESS NOTES
morphine derivative and had no problems/concerns     Codeine Itching     Past Medical History:   Diagnosis Date    Migraine     Osteoarthritis     Osteopenia      .[facility]h  Past Surgical History:   Procedure Laterality Date    HYSTERECTOMY (CERVIX STATUS UNKNOWN)      ORTHOPEDIC SURGERY  1996    right knee    TOTAL KNEE ARTHROPLASTY Right 06/27/2017    Dr Sena     No family history on file.  Social History     Socioeconomic History    Marital status:      Spouse name: Not on file    Number of children: Not on file    Years of education: Not on file    Highest education level: Not on file   Occupational History    Not on file   Tobacco Use    Smoking status: Former    Smokeless tobacco: Never    Tobacco comments:     Quit smoking: as teenager   Substance and Sexual Activity    Alcohol use: Yes    Drug use: Not on file    Sexual activity: Not on file   Other Topics Concern    Not on file   Social History Narrative    Not on file     Social Drivers of Health     Financial Resource Strain: Low Risk  (3/12/2025)    Received from [facility]    Financial Resource Strain     Difficulty Paying Living Expenses: Not hard at all     Difficulty Paying Medical Expenses: No   Food Insecurity: No Food Insecurity (3/12/2025)    Received from [facility]    Food Insecurity     Worried about Running Out of Food in the Last Year: Never true     Ran Out of Food in the Last Year: Never true   Transportation Needs: No Transportation Needs (3/12/2025)    Received from [facility]    Transportation Needs     Lack of Transportation: No   Physical Activity: Insufficiently Active (7/21/2024)    Received from [facility]    Physical Activity     Days of Exercise per Week: 4 days     On average, how many minutes do you exercise per day?: 30     Total Minutes of Exercise Per Week: 120   Stress: No Stress Concern Present (3/12/2025)    Received from [facility]    Stress     Feeling of Stress : Not at all   Social Connections:

## 2025-04-04 NOTE — TELEPHONE ENCOUNTER
Pt  is having her MRI  at [facility]  on 4/10    She  can  come   Monday 4/21  anytime  before 1   pm   Wed 23  after 1:30    Friday 4/25  any time

## 2025-04-10 ENCOUNTER — HOSPITAL ENCOUNTER (OUTPATIENT)
Dept: MRI IMAGING | Age: 70
Discharge: HOME | End: 2025-04-13
Payer: MEDICARE

## 2025-04-10 DIAGNOSIS — M25.552 LEFT HIP PAIN: ICD-10-CM

## 2025-04-10 PROCEDURE — 73721 MRI JNT OF LWR EXTRE W/O DYE: CPT

## 2025-04-21 ENCOUNTER — OFFICE VISIT (OUTPATIENT)
Dept: ORTHOPEDIC SURGERY | Age: 70
End: 2025-04-21
Payer: MEDICARE

## 2025-04-21 DIAGNOSIS — M46.1 SI JOINT ARTHRITIS: ICD-10-CM

## 2025-04-21 DIAGNOSIS — M16.12 PRIMARY OSTEOARTHRITIS OF LEFT HIP: Primary | ICD-10-CM

## 2025-04-21 DIAGNOSIS — M70.62 GREATER TROCHANTERIC BURSITIS OF LEFT HIP: ICD-10-CM

## 2025-04-21 PROCEDURE — G8400 PT W/DXA NO RESULTS DOC: HCPCS | Performed by: PHYSICIAN ASSISTANT

## 2025-04-21 PROCEDURE — G8428 CUR MEDS NOT DOCUMENT: HCPCS | Performed by: PHYSICIAN ASSISTANT

## 2025-04-21 PROCEDURE — 99213 OFFICE O/P EST LOW 20 MIN: CPT | Performed by: PHYSICIAN ASSISTANT

## 2025-04-21 PROCEDURE — 1090F PRES/ABSN URINE INCON ASSESS: CPT | Performed by: PHYSICIAN ASSISTANT

## 2025-04-21 PROCEDURE — 3017F COLORECTAL CA SCREEN DOC REV: CPT | Performed by: PHYSICIAN ASSISTANT

## 2025-04-21 PROCEDURE — 1036F TOBACCO NON-USER: CPT | Performed by: PHYSICIAN ASSISTANT

## 2025-04-21 PROCEDURE — G8421 BMI NOT CALCULATED: HCPCS | Performed by: PHYSICIAN ASSISTANT

## 2025-04-21 PROCEDURE — 20610 DRAIN/INJ JOINT/BURSA W/O US: CPT | Performed by: ORTHOPAEDIC SURGERY

## 2025-04-21 PROCEDURE — 1123F ACP DISCUSS/DSCN MKR DOCD: CPT | Performed by: PHYSICIAN ASSISTANT

## 2025-04-21 RX ORDER — TRIAMCINOLONE ACETONIDE 40 MG/ML
40 INJECTION, SUSPENSION INTRA-ARTICULAR; INTRAMUSCULAR ONCE
Status: COMPLETED | OUTPATIENT
Start: 2025-04-21 | End: 2025-04-21

## 2025-04-21 RX ADMIN — TRIAMCINOLONE ACETONIDE 40 MG: 40 INJECTION, SUSPENSION INTRA-ARTICULAR; INTRAMUSCULAR at 10:02

## 2025-04-21 NOTE — PROGRESS NOTES
CC: Left hip pain follow-up, MRI review    HPI: Patient returns today for follow-up on right hip pain which has been persistent for the past 7 months.  She localizes pain to left buttock, lateral aspect of the left hip, as well as left groin.  She reports pain radiates down the lateral aspect of left leg to the level of the knee at times.  She has failed to adequately respond to NSAID and stretch therapy.  The pain is limiting her ability to exercise.  Left hip MRI performed on 4/11/2025 revealed mild intersubstance degeneration of the left anterior and superior labrum with superimposed nondisplaced tear of the chondral labral junction.  Area full thickness cartilage loss involving the anterior superior acetabulum.  Moderate left gluteus minimus and medius insertional tendinosis with superimposed low-grade partial-thickness intrasubstance and undersurface tearing.  Mild peritrochanteric edema and greater trochanteric bursitis noted as well as mild SI joint osteoarthritis and multilevel discogenic degenerative changes in the lumbar spine noted per radiologist as well.  No other new complaints.    ROS: As per HPI; otherwise, 10 point review systems is negative.    PM SH: Reviewed and unchanged.    PE: Patient is alert and oriented, no acute distress.  Patient ambulates with a relatively normal reciprocal gait.  Examination of left hip reveals mild pain in groin with hip joint flexion and external rotation.  There is minimal tenderness over the left greater trochanter.  There is some tenderness over the left SI joint as well with some discomfort with GIANCARLO's  maneuver.  Leg lengths are approximately equal.  Neurovascular intact.    Assessment: Left hip pain, left hip OA, left gluteal tendinosis    Plan: Reviewed MRI findings with the patient which along with history and exam findings suggest that her left hip pain is multifactorial.  It is felt that she would benefit from left IA hip injection today both from a

## 2025-04-21 NOTE — PROGRESS NOTES
Name: Belle Desai  YOB: 1955  Gender: female  MRN: [id number]      Current Outpatient Medications:     ketoconazole (NIZORAL) 2 % cream, APPLY TO AFFECTED TOENAILS AT BEDTIME, Disp: , Rfl:     butalbital-aspirin-caffeine (FIORINAL) -40 MG capsule, TAKE 1 CAPSULE BY MOUTH 2 (TWO) TIMES A DAY AS NEEDED FOR HEADACHES OR MIGRAINE, Disp: , Rfl:     levothyroxine (SYNTHROID) 112 MCG tablet, TAKE 1.5 TABLETS BY MOUTH ON SUNDAY, TAKE 1 TABLET ALL OTHER DAYS (Patient not taking: Reported on 10/19/2023), Disp: , Rfl:     gabapentin (NEURONTIN) 100 MG capsule, Take 1 capsule by mouth nightly for 90 days., Disp: 30 capsule, Rfl: 2    diclofenac (VOLTAREN) 75 MG EC tablet, Take 1 tablet by mouth 2 times daily, Disp: 60 tablet, Rfl: 0    aspirin 325 MG EC tablet, Take 325 mg by mouth every 12 hours (Patient not taking: Reported on 10/19/2023), Disp: , Rfl:     levothyroxine (SYNTHROID) 125 MCG tablet, Take 1 tablet by mouth every morning (before breakfast), Disp: , Rfl:   Allergies   Allergen Reactions    Morphine Itching     Pt states has since had a morphine derivative and had no problems/concerns     Codeine Itching       CC:   Chief Complaint   Patient presents with    Follow-up     MRI left hip results possible Left hip IA injection today          DIAGNOSIS:   Encounter Diagnoses   Name Primary?    Primary osteoarthritis of left hip Yes    SI joint arthritis     Greater trochanteric bursitis of left hip         PROCEDURE: Intra-articular hip injection    Time out was performed which included identifying the patient by name and date of birth.  The procedure site was identified with all present in agreement.  The patient is here for injection of the left hip. The groin region was prepped with alcohol. Lateral to the neurovascular bundle, a 22 gauge spinal needle was directed into the right hip joint under x-ray guidance. The hip was then injected with 40 mg of Kenalog and 2 mL of 0.5% Marcaine.    The

## 2025-04-21 NOTE — PROGRESS NOTES
Name: Belle Desai  YOB: 1955  Gender: female  MRN: [id number]      Current Outpatient Medications:     ketoconazole (NIZORAL) 2 % cream, APPLY TO AFFECTED TOENAILS AT BEDTIME, Disp: , Rfl:     butalbital-aspirin-caffeine (FIORINAL) -40 MG capsule, TAKE 1 CAPSULE BY MOUTH 2 (TWO) TIMES A DAY AS NEEDED FOR HEADACHES OR MIGRAINE, Disp: , Rfl:     levothyroxine (SYNTHROID) 112 MCG tablet, TAKE 1.5 TABLETS BY MOUTH ON SUNDAY, TAKE 1 TABLET ALL OTHER DAYS (Patient not taking: Reported on 10/19/2023), Disp: , Rfl:     gabapentin (NEURONTIN) 100 MG capsule, Take 1 capsule by mouth nightly for 90 days., Disp: 30 capsule, Rfl: 2    diclofenac (VOLTAREN) 75 MG EC tablet, Take 1 tablet by mouth 2 times daily, Disp: 60 tablet, Rfl: 0    aspirin 325 MG EC tablet, Take 325 mg by mouth every 12 hours (Patient not taking: Reported on 10/19/2023), Disp: , Rfl:     levothyroxine (SYNTHROID) 125 MCG tablet, Take 1 tablet by mouth every morning (before breakfast), Disp: , Rfl:   Allergies   Allergen Reactions    Morphine Itching     Pt states has since had a morphine derivative and had no problems/concerns     Codeine Itching       CC:   Chief Complaint   Patient presents with    Hip Pain     Left hip IA injection        DIAGNOSIS:   Encounter Diagnosis   Name Primary?    Primary osteoarthritis of left hip Yes        PROCEDURE: Intra-articular hip injection    Time out was performed which included identifying the patient by name and date of birth.  The procedure site was identified with all present in agreement.  The patient is here for injection of the Left hip. The groin region was prepped with alcohol. Lateral to the neurovascular bundle, a 22 gauge spinal needle was directed into the right hip joint under x-ray guidance. The hip was then injected with 40 mg of Kenalog and 2 mL of 0.5% Marcaine.    The patient tolerated this procedure without difficulty.   The patient will keep their regularly scheduled

## 2025-05-16 ENCOUNTER — OFFICE VISIT (OUTPATIENT)
Dept: ORTHOPEDIC SURGERY | Age: 70
End: 2025-05-16
Payer: MEDICARE

## 2025-05-16 DIAGNOSIS — M25.552 LEFT HIP PAIN: ICD-10-CM

## 2025-05-16 DIAGNOSIS — M16.12 PRIMARY OSTEOARTHRITIS OF LEFT HIP: Primary | ICD-10-CM

## 2025-05-16 PROCEDURE — 1036F TOBACCO NON-USER: CPT | Performed by: PHYSICIAN ASSISTANT

## 2025-05-16 PROCEDURE — G8421 BMI NOT CALCULATED: HCPCS | Performed by: PHYSICIAN ASSISTANT

## 2025-05-16 PROCEDURE — 1090F PRES/ABSN URINE INCON ASSESS: CPT | Performed by: PHYSICIAN ASSISTANT

## 2025-05-16 PROCEDURE — G8428 CUR MEDS NOT DOCUMENT: HCPCS | Performed by: PHYSICIAN ASSISTANT

## 2025-05-16 PROCEDURE — G8400 PT W/DXA NO RESULTS DOC: HCPCS | Performed by: PHYSICIAN ASSISTANT

## 2025-05-16 PROCEDURE — 99213 OFFICE O/P EST LOW 20 MIN: CPT | Performed by: PHYSICIAN ASSISTANT

## 2025-05-16 PROCEDURE — 3017F COLORECTAL CA SCREEN DOC REV: CPT | Performed by: PHYSICIAN ASSISTANT

## 2025-05-16 PROCEDURE — 1123F ACP DISCUSS/DSCN MKR DOCD: CPT | Performed by: PHYSICIAN ASSISTANT

## 2025-05-16 NOTE — PROGRESS NOTES
Name: Belle Desai  YOB: 1955  Gender: female  MRN: [id number]    CC:   Chief Complaint   Patient presents with    Follow-up     S/p left hip IA injection done 4/21       HPI:  The left hip pain has been relieved substantially relieved with the IA injection performed on 4/21 with patient reporting 90% improvement.  They are more mobile and happy.  The constant ache is gone.  Function is improved.  No other new complaints.     ROS:  As per HPI. Pertinent postives and negatives are addressed with the patient, particularly those related to musculoskeletal concerns.  Non-orthopaedic concerns were referred back to the primary care physician.      PMFSH:    Current Outpatient Medications:     ketoconazole (NIZORAL) 2 % cream, APPLY TO AFFECTED TOENAILS AT BEDTIME, Disp: , Rfl:     butalbital-aspirin-caffeine (FIORINAL) -40 MG capsule, TAKE 1 CAPSULE BY MOUTH 2 (TWO) TIMES A DAY AS NEEDED FOR HEADACHES OR MIGRAINE, Disp: , Rfl:     levothyroxine (SYNTHROID) 112 MCG tablet, TAKE 1.5 TABLETS BY MOUTH ON SUNDAY, TAKE 1 TABLET ALL OTHER DAYS (Patient not taking: Reported on 10/19/2023), Disp: , Rfl:     gabapentin (NEURONTIN) 100 MG capsule, Take 1 capsule by mouth nightly for 90 days., Disp: 30 capsule, Rfl: 2    diclofenac (VOLTAREN) 75 MG EC tablet, Take 1 tablet by mouth 2 times daily, Disp: 60 tablet, Rfl: 0    aspirin 325 MG EC tablet, Take 325 mg by mouth every 12 hours (Patient not taking: Reported on 10/19/2023), Disp: , Rfl:     levothyroxine (SYNTHROID) 125 MCG tablet, Take 1 tablet by mouth every morning (before breakfast), Disp: , Rfl:   Allergies   Allergen Reactions    Morphine Itching     Pt states has since had a morphine derivative and had no problems/concerns     Codeine Itching     Past Medical History:   Diagnosis Date    Migraine     Osteoarthritis     Osteopenia      .pm[facility]  Past Surgical History:   Procedure Laterality Date    HYSTERECTOMY (CERVIX STATUS UNKNOWN)      ORTHOPEDIC

## 2025-06-13 ENCOUNTER — OFFICE VISIT (OUTPATIENT)
Dept: ORTHOPEDIC SURGERY | Age: 70
End: 2025-06-13
Payer: MEDICARE

## 2025-06-13 DIAGNOSIS — M17.12 LOCALIZED OSTEOARTHRITIS OF LEFT KNEE: Primary | ICD-10-CM

## 2025-06-13 PROCEDURE — 1090F PRES/ABSN URINE INCON ASSESS: CPT | Performed by: PHYSICIAN ASSISTANT

## 2025-06-13 PROCEDURE — G8421 BMI NOT CALCULATED: HCPCS | Performed by: PHYSICIAN ASSISTANT

## 2025-06-13 PROCEDURE — 20611 DRAIN/INJ JOINT/BURSA W/US: CPT | Performed by: PHYSICIAN ASSISTANT

## 2025-06-13 PROCEDURE — G8400 PT W/DXA NO RESULTS DOC: HCPCS | Performed by: PHYSICIAN ASSISTANT

## 2025-06-13 PROCEDURE — 99214 OFFICE O/P EST MOD 30 MIN: CPT | Performed by: PHYSICIAN ASSISTANT

## 2025-06-13 PROCEDURE — 1036F TOBACCO NON-USER: CPT | Performed by: PHYSICIAN ASSISTANT

## 2025-06-13 PROCEDURE — 3017F COLORECTAL CA SCREEN DOC REV: CPT | Performed by: PHYSICIAN ASSISTANT

## 2025-06-13 PROCEDURE — 1123F ACP DISCUSS/DSCN MKR DOCD: CPT | Performed by: PHYSICIAN ASSISTANT

## 2025-06-13 PROCEDURE — G8428 CUR MEDS NOT DOCUMENT: HCPCS | Performed by: PHYSICIAN ASSISTANT

## 2025-06-13 RX ORDER — TRIAMCINOLONE ACETONIDE 40 MG/ML
40 INJECTION, SUSPENSION INTRA-ARTICULAR; INTRAMUSCULAR ONCE
Status: COMPLETED | OUTPATIENT
Start: 2025-06-13 | End: 2025-06-13

## 2025-06-13 RX ADMIN — TRIAMCINOLONE ACETONIDE 40 MG: 40 INJECTION, SUSPENSION INTRA-ARTICULAR; INTRAMUSCULAR at 14:21

## 2025-06-13 NOTE — PROGRESS NOTES
follow them as scheduled.  She plans to return at the end of August to repeat left knee HP.    Approximately 30 minutes was spent reviewing the medical record, imaging, performing history and physical examination, discussing the diagnosis and treatment plan with the patient, and completing documentation for this visit.

## 2025-07-29 ENCOUNTER — TELEPHONE (OUTPATIENT)
Dept: ORTHOPEDIC SURGERY | Age: 70
End: 2025-07-29

## 2025-07-29 NOTE — TELEPHONE ENCOUNTER
Left vm letting patient know I had to cancel her first gel injection appt on 8/22 due to [facility] being in surgery. She can either reschedule that appt to 8/25 or just start her first gel injection on 8/29 which would have been her 2nd injection

## 2025-08-29 ENCOUNTER — TELEPHONE (OUTPATIENT)
Dept: ORTHOPEDIC SURGERY | Age: 70
End: 2025-08-29

## 2025-08-29 DIAGNOSIS — M17.12 LOCALIZED OSTEOARTHRITIS OF LEFT KNEE: Primary | ICD-10-CM

## 2025-09-05 ENCOUNTER — OFFICE VISIT (OUTPATIENT)
Dept: ORTHOPEDIC SURGERY | Age: 70
End: 2025-09-05

## 2025-09-05 DIAGNOSIS — M17.12 LOCALIZED OSTEOARTHRITIS OF LEFT KNEE: Primary | ICD-10-CM

## (undated) DEVICE — (D)PREP SKN CHLRAPRP APPL 26ML -- CONVERT TO ITEM 371833

## (undated) DEVICE — SOLUTION IV 500ML 0.9% SOD CHL FLX CONT

## (undated) DEVICE — 2000CC GUARDIAN II: Brand: GUARDIAN

## (undated) DEVICE — TRAY CATH 16F DRN BG LTX -- CONVERT TO ITEM 363158

## (undated) DEVICE — DRAPE,TOP,102X53,STERILE: Brand: MEDLINE

## (undated) DEVICE — NEEDLE HYPO 18GA L1.5IN PNK S STL HUB POLYPR SHLD REG BVL

## (undated) DEVICE — BUTTON SWITCH PENCIL BLADE ELECTRODE, HOLSTER: Brand: EDGE

## (undated) DEVICE — SUTURE VCRL SZ 1 L27IN ABSRB UD L36MM CP-1 1/2 CIR REV CUT J268H

## (undated) DEVICE — SYR LR LCK 1ML GRAD NSAF 30ML --

## (undated) DEVICE — REM POLYHESIVE ADULT PATIENT RETURN ELECTRODE: Brand: VALLEYLAB

## (undated) DEVICE — SOLUTION IV 1000ML 0.9% SOD CHL

## (undated) DEVICE — AMD ANTIMICROBIAL NON-ADHERENT PAD,0.2% POLYHEXAMETHYLENE BIGUANIDE HCI (PHMB): Brand: TELFA

## (undated) DEVICE — OSCILLATING TIP SAW CARTRIDGE: Brand: STRYKER PRECISION

## (undated) DEVICE — STOCKINETTE TUBE 9X48 -- MEDICHOICE

## (undated) DEVICE — FAN SPRAY KIT: Brand: PULSAVAC®

## (undated) DEVICE — SKIN STAPLER: Brand: SIGNET

## (undated) DEVICE — NEEDLE HYPO 21GA L1.5IN INTRAMUSCULAR S STL LATCH BVL UP

## (undated) DEVICE — GOWN,REINF,POLY,ECL,PP SLV,XL: Brand: MEDLINE

## (undated) DEVICE — Z DISCONTINUED USE 2744636  DRESSING AQUACEL 14 IN ALG W3.5XL14IN POLYUR FLM CVR W/ HYDRCOLL

## (undated) DEVICE — TRAY PREP DRY W/ PREM GLV 2 APPL 6 SPNG 2 UNDPD 1 OVERWRAP

## (undated) DEVICE — CURETTE BNE CEM 10IN DISP --

## (undated) DEVICE — SYR 50ML LR LCK 1ML GRAD NSAF --

## (undated) DEVICE — PACK PROCEDURE SURG TOT KNEE

## (undated) DEVICE — 3M™ COBAN™ NL STERILE NON-LATEX SELF-ADHERENT WRAP, 2084S, 4 IN X 5 YD (10 CM X 4,5 M), 18 ROLLS/CASE: Brand: 3M™ COBAN™

## (undated) DEVICE — SUTURE PDS II SZ 1 L96IN ABSRB VLT TP-1 L65MM 1/2 CIR Z880G

## (undated) DEVICE — T4 HOOD

## (undated) DEVICE — BIPOLAR SEALER 23-112-1 AQM 6.0: Brand: AQUAMANTYS ®

## (undated) DEVICE — SUTURE MCRYL SZ 2-0 L27IN ABSRB UD CP-1 1 L36MM 1/2 CIR REV Y266H

## (undated) DEVICE — 3000CC GUARDIAN II: Brand: GUARDIAN

## (undated) DEVICE — SOLUTION IRRIG 3000ML 0.9% SOD CHL FLX CONT 0797208] ICU MEDICAL INC]

## (undated) DEVICE — MEDI-VAC YANKAUER SUCTION HANDLE W/BULBOUS TIP: Brand: CARDINAL HEALTH